# Patient Record
Sex: FEMALE | Race: WHITE | Employment: PART TIME | URBAN - METROPOLITAN AREA
[De-identification: names, ages, dates, MRNs, and addresses within clinical notes are randomized per-mention and may not be internally consistent; named-entity substitution may affect disease eponyms.]

---

## 2017-11-03 ENCOUNTER — OFFICE VISIT (OUTPATIENT)
Dept: FAMILY MEDICINE CLINIC | Age: 52
End: 2017-11-03

## 2017-11-03 VITALS
OXYGEN SATURATION: 100 % | SYSTOLIC BLOOD PRESSURE: 120 MMHG | WEIGHT: 181 LBS | RESPIRATION RATE: 16 BRPM | HEIGHT: 66 IN | DIASTOLIC BLOOD PRESSURE: 73 MMHG | HEART RATE: 93 BPM | BODY MASS INDEX: 29.09 KG/M2 | TEMPERATURE: 97.4 F

## 2017-11-03 DIAGNOSIS — G43.809 OTHER MIGRAINE WITHOUT STATUS MIGRAINOSUS, NOT INTRACTABLE: Primary | ICD-10-CM

## 2017-11-03 DIAGNOSIS — R11.0 NAUSEA: ICD-10-CM

## 2017-11-03 DIAGNOSIS — M50.30 DDD (DEGENERATIVE DISC DISEASE), CERVICAL: ICD-10-CM

## 2017-11-03 DIAGNOSIS — Z12.39 SCREENING FOR BREAST CANCER: ICD-10-CM

## 2017-11-03 DIAGNOSIS — Z23 ENCOUNTER FOR IMMUNIZATION: ICD-10-CM

## 2017-11-03 DIAGNOSIS — I10 ESSENTIAL HYPERTENSION: ICD-10-CM

## 2017-11-03 DIAGNOSIS — R10.13 EPIGASTRIC ABDOMINAL PAIN: ICD-10-CM

## 2017-11-03 DIAGNOSIS — M51.36 DDD (DEGENERATIVE DISC DISEASE), LUMBAR: ICD-10-CM

## 2017-11-03 PROBLEM — G43.909 MIGRAINE: Status: ACTIVE | Noted: 2017-11-03

## 2017-11-03 RX ORDER — OMEPRAZOLE 20 MG/1
CAPSULE, DELAYED RELEASE ORAL
COMMUNITY
Start: 2017-10-02 | End: 2017-11-03 | Stop reason: ALTCHOICE

## 2017-11-03 RX ORDER — LIDOCAINE 50 MG/G
PATCH TOPICAL EVERY 24 HOURS
COMMUNITY

## 2017-11-03 RX ORDER — SUMATRIPTAN 50 MG/1
100 TABLET, FILM COATED ORAL
COMMUNITY
End: 2017-11-03 | Stop reason: SDUPTHER

## 2017-11-03 RX ORDER — TOPIRAMATE 50 MG/1
50 TABLET, FILM COATED ORAL DAILY
COMMUNITY
End: 2018-07-13 | Stop reason: ALTCHOICE

## 2017-11-03 RX ORDER — TRAMADOL HYDROCHLORIDE 50 MG/1
TABLET ORAL
Refills: 0 | COMMUNITY
Start: 2017-10-26 | End: 2019-10-16

## 2017-11-03 RX ORDER — DULOXETIN HYDROCHLORIDE 30 MG/1
CAPSULE, DELAYED RELEASE ORAL
COMMUNITY
Start: 2017-08-22 | End: 2017-11-03 | Stop reason: ALTCHOICE

## 2017-11-03 RX ORDER — SUMATRIPTAN 50 MG/1
100 TABLET, FILM COATED ORAL
Qty: 10 TAB | Refills: 0 | Status: SHIPPED | OUTPATIENT
Start: 2017-11-03 | End: 2017-12-07 | Stop reason: SDUPTHER

## 2017-11-03 RX ORDER — LISINOPRIL 5 MG/1
TABLET ORAL
COMMUNITY
Start: 2017-10-02 | End: 2018-07-13

## 2017-11-03 RX ORDER — DICLOFENAC SODIUM 10 MG/G
GEL TOPICAL
Refills: 2 | COMMUNITY
Start: 2017-09-12 | End: 2019-11-01 | Stop reason: ALTCHOICE

## 2017-11-03 RX ORDER — ONDANSETRON 4 MG/1
TABLET, ORALLY DISINTEGRATING ORAL
COMMUNITY
Start: 2017-10-02 | End: 2017-11-03 | Stop reason: SDUPTHER

## 2017-11-03 RX ORDER — DULOXETIN HYDROCHLORIDE 60 MG/1
CAPSULE, DELAYED RELEASE ORAL
COMMUNITY
Start: 2017-08-22 | End: 2017-11-03 | Stop reason: ALTCHOICE

## 2017-11-03 RX ORDER — PANTOPRAZOLE SODIUM 40 MG/1
40 TABLET, DELAYED RELEASE ORAL 2 TIMES DAILY
Qty: 60 TAB | Refills: 5 | Status: SHIPPED | OUTPATIENT
Start: 2017-11-03 | End: 2018-06-15 | Stop reason: SDUPTHER

## 2017-11-03 RX ORDER — ESTROGENS, CONJUGATED 0.9 MG/1
TABLET, FILM COATED ORAL
COMMUNITY
Start: 2017-09-18 | End: 2018-04-22 | Stop reason: SDUPTHER

## 2017-11-03 RX ORDER — ONDANSETRON 4 MG/1
4 TABLET, ORALLY DISINTEGRATING ORAL
Qty: 30 TAB | Refills: 0 | Status: SHIPPED | OUTPATIENT
Start: 2017-11-03 | End: 2018-06-15 | Stop reason: SDUPTHER

## 2017-11-03 NOTE — PROGRESS NOTES
Chief Complaint   Patient presents with    New Patient    Hypertension    Migraine    Medication Refill    Nausea     Pt states that she had a ABD US at [facility] on 10-17-17. HISTORY OF PRESENT ILLNESS  Shaye Courser is a 46 y.o. female. HPI   Pt here to establish. She has been seen by [facility] providers but wanted to switch to [facility]. Pt has hx of htn. Her bp is well controlled with her current regimen. Pt reports that she has been having issues with back pain. She has DDD that started in her neck 20 yrs ago; now she has it in her lower back as well. She has had PT and is seeing pain management. This is going well. She has had JOHANA that worked nicely for her. Now her mid-back is starting to bother her. She will f/u with pain management for this concern as well. Pt had been having an evaluation for abdominal issues. This has been going on for a few years. NSAIDs give her stomach problems. Since Aug 2017, she has had nausea daily. Sometimes it lasts all day. She was told initially that she had indigestion. She does have gerd but prilosec does not help. Pt was given zofran that does help somewhat. Pt has had an abd ultrasound but does not have the results. She was given a copy of the images but does not have the report. Pt was not able to see gi. She has not had any vomiting. She sometimes feels like vomiting would improve the nausea. Pt does have hx of IBS. She has been somewhat constipated lately which is not normal for her. She does feel that she is staying well hydrated. Pt has hx of migraine. It had improved after a hysterectomy in 2013 and she was able to stop topamax. The migraines returned so she went back on topamax. She now wants to stop topamax again. Pt recalls that she has been on amitriptylene in the past and it was too sedating so she knows she would not want to try that again.       Review of Systems   Constitutional: Negative. HENT: Negative. Respiratory: Negative. Cardiovascular: Negative. Gastrointestinal: Positive for abdominal pain, constipation and nausea. Negative for vomiting. Musculoskeletal: Positive for back pain and neck pain. All other systems reviewed and are negative. Physical Exam  Physical Exam   Nursing note and vitals reviewed. Constitutional: She is oriented to person, place, and time. She appears well-developed and well-nourished. HENT:   Head: Normocephalic and atraumatic. Right Ear: External ear normal.   Left Ear: External ear normal.   Nose: Nose normal.   Eyes: Conjunctivae and EOM are normal.   Neck: Normal range of motion. Neck supple. No JVD present. Carotid bruit is not present. No thyromegaly present. Cardiovascular: Normal rate, regular rhythm, normal heart sounds and intact distal pulses. Exam reveals no gallop and no friction rub. No murmur heard. Pulmonary/Chest: Effort normal and breath sounds normal. She has no wheezes. She has no rhonchi. She has no rales. Abdominal: Soft. Bowel sounds are normal.  moderate mid-epigastric abd pain. No hepatosplenomegaly. Musculoskeletal: Normal range of motion. Neurological: She is alert and oriented to person, place, and time. Coordination normal.   Skin: Skin is warm and dry. Psychiatric: She has a normal mood and affect. Her behavior is normal. Judgment and thought content normal.     ASSESSMENT and PLAN  Diagnoses and all orders for this visit:    1. Other migraine without status migrainosus, not intractable  -     SUMAtriptan (IMITREX) 50 mg tablet; Take 2 Tabs by mouth once as needed for Migraine. 2. Essential hypertension  Stable, cont pres tx plan. 3. DDD (degenerative disc disease), cervical  4. DDD (degenerative disc disease), lumbar  Care as per pain management. 5. Nausea  -     REFERRAL TO GASTROENTEROLOGY  -     pantoprazole (PROTONIX) 40 mg tablet; Take 1 Tab by mouth two (2) times a day.   - ondansetron (ZOFRAN ODT) 4 mg disintegrating tablet; Take 1 Tab by mouth every eight (8) hours as needed for Nausea. 6. Epigastric abdominal pain  -     REFERRAL TO GASTROENTEROLOGY  -     pantoprazole (PROTONIX) 40 mg tablet; Take 1 Tab by mouth two (2) times a day. 7. Screening for breast cancer  -     [facility] MAMMO BI SCREENING INCL CAD; Future    8.  Encounter for immunization  -     Influenza virus vaccine (QUADRIVALENT PRES FREE SYRINGE) IM ([id number])      Follow-up Disposition: 1 month; sooner prn

## 2017-11-03 NOTE — PROGRESS NOTES
Slade Lao 46 y.o. female   Chief Complaint   Patient presents with    New Patient    Hypertension    Migraine    Medication Refill         Slade Lao 1965 female who presents for routine immunizations. Patient denies any symptoms , reactions or allergies that would exclude them from being immunized today. Risks and adverse reactions were discussed and the VIS was given to them. All questions were addressed. Order placed for flu,  per Verbal Order from  with read back. Patient was observed for 15 min post injection. There were no reactions observed.     Lalita Mcnamara LPN

## 2017-11-14 ENCOUNTER — TELEPHONE (OUTPATIENT)
Dept: FAMILY MEDICINE CLINIC | Age: 52
End: 2017-11-14

## 2017-11-14 NOTE — TELEPHONE ENCOUNTER
Pt called and stated that she was referred to [facility] and states she called [facility] an no referral has been placed as of yet. Pt would like to know status. Please advise.

## 2017-11-21 ENCOUNTER — HOSPITAL ENCOUNTER (OUTPATIENT)
Dept: MAMMOGRAPHY | Age: 52
Discharge: HOME | End: 2017-11-21
Attending: FAMILY MEDICINE
Payer: OTHER GOVERNMENT

## 2017-11-21 DIAGNOSIS — Z12.39 SCREENING FOR BREAST CANCER: ICD-10-CM

## 2017-11-21 PROCEDURE — 77067 SCR MAMMO BI INCL CAD: CPT

## 2017-12-04 ENCOUNTER — OFFICE VISIT (OUTPATIENT)
Dept: FAMILY MEDICINE CLINIC | Age: 52
End: 2017-12-04

## 2017-12-04 VITALS
BODY MASS INDEX: 28.77 KG/M2 | DIASTOLIC BLOOD PRESSURE: 78 MMHG | TEMPERATURE: 98 F | RESPIRATION RATE: 18 BRPM | HEIGHT: 66 IN | SYSTOLIC BLOOD PRESSURE: 135 MMHG | WEIGHT: 179 LBS | HEART RATE: 96 BPM | OXYGEN SATURATION: 100 %

## 2017-12-04 DIAGNOSIS — Z79.890 POSTMENOPAUSAL HRT (HORMONE REPLACEMENT THERAPY): ICD-10-CM

## 2017-12-04 DIAGNOSIS — I10 ESSENTIAL HYPERTENSION: ICD-10-CM

## 2017-12-04 DIAGNOSIS — G43.809 OTHER MIGRAINE WITHOUT STATUS MIGRAINOSUS, NOT INTRACTABLE: Primary | ICD-10-CM

## 2017-12-04 RX ORDER — ATENOLOL 25 MG/1
25 TABLET ORAL DAILY
Qty: 30 TAB | Refills: 5 | Status: SHIPPED | OUTPATIENT
Start: 2017-12-04 | End: 2018-06-03 | Stop reason: SDUPTHER

## 2017-12-04 NOTE — PROGRESS NOTES
HISTORY OF PRESENT ILLNESS  Master Osborn is a 46 y.o. female. Chief Complaint   Patient presents with    Follow-up     1 month f/u    Migraine    Hypertension       HPI  Patient is here for a 1 month follow up of htn, and migraines. Pt reports that her stomach did improve with the protonix bid. Pt has also been seen by Dr Chyna Lao (gi). She will have an egd in Jan 2018. Pt has been tapering off of topamax. She has not had an increase in the frequency of her ha's. She has had a few but they do respond after taking imitrex 100mg. Pt is interested in trying a bp med to help prevent migraines. Patient does need medication refills today. Patient requests electronic refills. New concerns today: pt has been on HRT. She would like a referral to gyn to cont this. Review of Systems   Constitutional: Negative. HENT: Negative. Respiratory: Negative. Cardiovascular: Negative. Gastrointestinal: Positive for abdominal pain. All other systems reviewed and are negative. Physical Exam  Physical Exam   Nursing note and vitals reviewed. Constitutional: She is oriented to person, place, and time. She appears well-developed and well-nourished. HENT:   Head: Normocephalic and atraumatic. Right Ear: External ear normal.   Left Ear: External ear normal.   Nose: Nose normal.   Eyes: Conjunctivae and EOM are normal.   Neck: Normal range of motion. Neck supple. No JVD present. Carotid bruit is not present. No thyromegaly present. Cardiovascular: Normal rate, regular rhythm, normal heart sounds and intact distal pulses. Exam reveals no gallop and no friction rub. No murmur heard. Pulmonary/Chest: Effort normal and breath sounds normal. She has no wheezes. She has no rhonchi. She has no rales. Abdominal: Soft. Bowel sounds are normal.   Musculoskeletal: Normal range of motion. Neurological: She is alert and oriented to person, place, and time.  Coordination normal.   Skin: Skin is warm and dry. Psychiatric: She has a normal mood and affect. Her behavior is normal. Judgment and thought content normal.     ASSESSMENT and PLAN  Diagnoses and all orders for this visit:    1. Other migraine without status migrainosus, not intractable  -    Trial:  atenolol (TENORMIN) 25 mg tablet; Take 1 Tab by mouth daily. Start after topamax taper is complete. 2. Essential hypertension  Stable, cont pres tx plan.      3. Postmenopausal HRT (hormone replacement therapy)  -     REFERRAL TO OBSTETRICS AND GYNECOLOGY      Follow-up Disposition: 1 month; sooner prn

## 2017-12-04 NOTE — PROGRESS NOTES
Eri Estrada 46 y.o. female   Chief Complaint   Patient presents with    Follow-up     1 month f/u    Migraine    Hypertension         1. Have you been to the ER, urgent care clinic since your last visit? Hospitalized since your last visit? No    2. Have you seen or consulted any other health care providers outside of the [address] [place] [address] since your last visit? Include any pap smears or colon screening.  No

## 2017-12-07 DIAGNOSIS — G43.809 OTHER MIGRAINE WITHOUT STATUS MIGRAINOSUS, NOT INTRACTABLE: ICD-10-CM

## 2017-12-08 RX ORDER — SUMATRIPTAN 50 MG/1
100 TABLET, FILM COATED ORAL
Qty: 10 TAB | Refills: 0 | Status: SHIPPED | OUTPATIENT
Start: 2017-12-08 | End: 2018-02-05 | Stop reason: SDUPTHER

## 2018-01-19 ENCOUNTER — OFFICE VISIT (OUTPATIENT)
Dept: FAMILY MEDICINE CLINIC | Age: 53
End: 2018-01-19

## 2018-01-19 VITALS
RESPIRATION RATE: 20 BRPM | BODY MASS INDEX: 29.25 KG/M2 | DIASTOLIC BLOOD PRESSURE: 62 MMHG | WEIGHT: 182 LBS | OXYGEN SATURATION: 98 % | TEMPERATURE: 98.2 F | HEART RATE: 84 BPM | SYSTOLIC BLOOD PRESSURE: 118 MMHG | HEIGHT: 66 IN

## 2018-01-19 DIAGNOSIS — G43.809 OTHER MIGRAINE WITHOUT STATUS MIGRAINOSUS, NOT INTRACTABLE: ICD-10-CM

## 2018-01-19 DIAGNOSIS — I10 ESSENTIAL HYPERTENSION: Primary | ICD-10-CM

## 2018-01-19 DIAGNOSIS — N28.1 RENAL CYST: ICD-10-CM

## 2018-01-19 RX ORDER — ONDANSETRON 4 MG/1
TABLET, FILM COATED ORAL
Refills: 0 | COMMUNITY
Start: 2018-01-12 | End: 2018-06-15 | Stop reason: SDUPTHER

## 2018-01-19 NOTE — PROGRESS NOTES
Chief Complaint   Patient presents with    Follow-up     1 month f/u    Hypertension    Migraine     Discuss alternative medication, imitrex not effective.  Results     discuss MRI completed on 12-12-17     1. Have you been to the ER, urgent care clinic since your last visit? Hospitalized since your last visit? No    2. Have you seen or consulted any other health care providers outside of the [address] [place] [address] since your last visit? Include any pap smears or colon screening.  No

## 2018-01-19 NOTE — PROGRESS NOTES
HISTORY OF PRESENT ILLNESS  Adolphus Duverney is a 46 y.o. female. Chief Complaint   Patient presents with    Follow-up     1 month f/u    Hypertension    Migraine     Discuss alternative medication, imitrex not effective.  Results     discuss MRI completed on 12-12-17       HPI  Patient is here for a 1 month follow up of Htn, migraines, and MRI results completed on 12-12-17. Patient would like to discuss an alternative migraine medication. She does not feel that the imitrex is as effective. She has stopped the topamax since her last visit and the migraines are happening more often. When the ha's occur, the imitrex decreases the pain but then ha is back the following day at the prior pain level. She is concerned that perhaps the imitrex is causing rebound ha. The ha's have occurred roughly for about 5 days in a row but she will go 3 wks or so prior to having ha again. She has significant nausea and wonders if the migraines can be related to her gi issues. Pt has not yet seen gyn to discuss HRT. Pt has seen gi recently. pt had an injection recently for her back. Patient does need medication refills today. Patient requests electronic refills. New concerns today:  Pt had an mri of her spine that showed an incidental cystic structure in her kidney or adrenal gland. Pt was told to f/u for that. Review of Systems   Constitutional: Negative. HENT: Negative. Respiratory: Negative. Cardiovascular: Negative. Neurological: Positive for headaches. All other systems reviewed and are negative. Physical Exam  Physical Exam   Nursing note and vitals reviewed. Constitutional: She is oriented to person, place, and time. She appears well-developed and well-nourished. HENT:   Head: Normocephalic and atraumatic. Right Ear: External ear normal.   Left Ear: External ear normal.   Nose: Nose normal.   Eyes: Conjunctivae and EOM are normal.   Neck: Normal range of motion. Neck supple. No JVD present. Carotid bruit is not present. No thyromegaly present. Cardiovascular: Normal rate, regular rhythm, normal heart sounds and intact distal pulses. Exam reveals no gallop and no friction rub. No murmur heard. Pulmonary/Chest: Effort normal and breath sounds normal. She has no wheezes. She has no rhonchi. She has no rales. Abdominal: Soft. Bowel sounds are normal.   Musculoskeletal: Normal range of motion. Neurological: She is alert and oriented to person, place, and time. Coordination normal.   Skin: Skin is warm and dry. Psychiatric: She has a normal mood and affect. Her behavior is normal. Judgment and thought content normal.     ASSESSMENT and PLAN  Diagnoses and all orders for this visit:    1. Essential hypertension  Stable, cont pres tx plan.      2. Other migraine without status migrainosus, not intractable  -     REFERRAL TO NEUROLOGY    3. Renal cyst  -     [facility] Ref MMD      Follow-up Disposition: 3 months; sooner prn

## 2018-02-05 DIAGNOSIS — G43.809 OTHER MIGRAINE WITHOUT STATUS MIGRAINOSUS, NOT INTRACTABLE: ICD-10-CM

## 2018-02-06 RX ORDER — SUMATRIPTAN 50 MG/1
TABLET, FILM COATED ORAL
Qty: 10 TAB | Refills: 0 | Status: SHIPPED | OUTPATIENT
Start: 2018-02-06 | End: 2018-05-28 | Stop reason: SDUPTHER

## 2018-04-23 RX ORDER — ESTROGENS, CONJUGATED 0.9 MG/1
TABLET, FILM COATED ORAL
Qty: 90 TAB | Refills: 3 | Status: SHIPPED | OUTPATIENT
Start: 2018-04-23 | End: 2018-06-15 | Stop reason: SDUPTHER

## 2018-06-15 ENCOUNTER — OFFICE VISIT (OUTPATIENT)
Dept: FAMILY MEDICINE CLINIC | Age: 53
End: 2018-06-15

## 2018-06-15 VITALS
OXYGEN SATURATION: 98 % | RESPIRATION RATE: 16 BRPM | WEIGHT: 183 LBS | DIASTOLIC BLOOD PRESSURE: 73 MMHG | HEART RATE: 84 BPM | BODY MASS INDEX: 29.41 KG/M2 | TEMPERATURE: 98.3 F | HEIGHT: 66 IN | SYSTOLIC BLOOD PRESSURE: 128 MMHG

## 2018-06-15 DIAGNOSIS — I10 ESSENTIAL HYPERTENSION: ICD-10-CM

## 2018-06-15 DIAGNOSIS — R11.0 NAUSEA: ICD-10-CM

## 2018-06-15 DIAGNOSIS — G43.809 OTHER MIGRAINE WITHOUT STATUS MIGRAINOSUS, NOT INTRACTABLE: Primary | ICD-10-CM

## 2018-06-15 DIAGNOSIS — R10.13 EPIGASTRIC ABDOMINAL PAIN: ICD-10-CM

## 2018-06-15 DIAGNOSIS — Z79.890 POSTMENOPAUSAL HRT (HORMONE REPLACEMENT THERAPY): ICD-10-CM

## 2018-06-15 RX ORDER — ONDANSETRON 4 MG/1
4 TABLET, ORALLY DISINTEGRATING ORAL
Qty: 30 TAB | Refills: 0 | Status: SHIPPED | OUTPATIENT
Start: 2018-06-15 | End: 2019-08-05 | Stop reason: SDUPTHER

## 2018-06-15 RX ORDER — ATENOLOL 25 MG/1
TABLET ORAL
Qty: 30 TAB | Refills: 0 | Status: SHIPPED | OUTPATIENT
Start: 2018-06-15 | End: 2018-07-13 | Stop reason: SDUPTHER

## 2018-06-15 RX ORDER — SUMATRIPTAN 50 MG/1
TABLET, FILM COATED ORAL
Qty: 10 TAB | Refills: 0 | Status: SHIPPED | OUTPATIENT
Start: 2018-06-15 | End: 2019-08-05 | Stop reason: SDUPTHER

## 2018-06-15 RX ORDER — PANTOPRAZOLE SODIUM 40 MG/1
40 TABLET, DELAYED RELEASE ORAL 2 TIMES DAILY
Qty: 60 TAB | Refills: 5 | Status: SHIPPED | OUTPATIENT
Start: 2018-06-15 | End: 2018-07-20 | Stop reason: SDUPTHER

## 2018-06-15 NOTE — MR AVS SNAPSHOT
[address] [place] [address] 
 
 
 Sofia Hwang [id number] 
[phone] Patient: Riley Duff MRN: [id number] :1965 Visit Information Date & Time Provider Department Dept. Phone Encounter #  
 6/15/2018  9:00 AM Truong Caballero NP [address] [id number] Your Appointments 2018 10:45 AM  
Follow Up with Benny Westfall MD  
[facility] (--) Appt Note: med refill [place]wang [id number]  
[phone]  
  
   
 Sofia Hwang [id number] Upcoming Health Maintenance Date Due Hepatitis C Screening 1965 DTaP/Tdap/Td series (1 - Tdap) 3/18/1986 PAP AKA CERVICAL CYTOLOGY 3/18/1986 FOBT Q 1 YEAR AGE 50-75 3/18/2015 Influenza Age 5 to Adult 2018 BREAST CANCER SCRN MAMMOGRAM 2019 Allergies as of 6/15/2018  Review Complete On: 6/15/2018 By: Truong Caballero NP Severity Noted Reaction Type Reactions Prednisone  2017    Other (comments) Current Immunizations  Never Reviewed Name Date Influenza Vaccine (Quad) PF 11/3/2017 Not reviewed this visit You Were Diagnosed With   
  
 Codes Comments Other migraine without status migrainosus, not intractable    -  Primary ICD-10-CM: G17.757 ICD-9-CM: 346.80 Nausea     ICD-10-CM: R11.0 ICD-9-CM: 787.02 Epigastric abdominal pain     ICD-10-CM: R10.13 ICD-9-CM: 789.06 Essential hypertension     ICD-10-CM: I10 
ICD-9-CM: 401.9 Postmenopausal HRT (hormone replacement therapy)     ICD-10-CM: A17.156 ICD-9-CM: V07.4 Vitals BP Pulse Temp Resp Height(growth percentile) Weight(growth percentile) 128/73 (BP 1 Location: Right arm, BP Patient Position: Sitting) 84 98.3 °F (36.8 °C) (Oral) 16 5' 5.5\" (1.664 m) 183 lb (83 kg) SpO2 BMI OB Status Smoking Status 98% 29.99 kg/m2 Hysterectomy Former Smoker BMI and BSA Data Body Mass Index Body Surface Area  
 29.99 kg/m 2 1.96 m 2 Preferred Pharmacy Pharmacy Name Phone [facility] [address] [place] [address], [address] [place] [address] Rd [phone] Your Updated Medication List  
  
   
This list is accurate as of 6/15/18  9:54 AM.  Always use your most recent med list.  
  
  
  
  
 atenolol 25 mg tablet Commonly known as:  TENORMIN  
take 1 tablet by mouth once daily  
  
 conjugated estrogens 0.9 mg tablet Commonly known as:  PREMARIN  
take 1 tablet by mouth once daily  
  
 diclofenac 1 % Gel Commonly known as:  VOLTAREN  
apply 2.5 grams to the affected areas 3-4 times daily  
  
 lidocaine 5 % Commonly known as:  LIDODERM  
by TransDERmal route every twenty-four (24) hours. Apply patch to the affected area for 12 hours a day and remove for 12 hours a day. lisinopril 5 mg tablet Commonly known as:  PRINIVIL, ZESTRIL  
  
 ondansetron 4 mg disintegrating tablet Commonly known as:  ZOFRAN ODT Take 1 Tab by mouth every eight (8) hours as needed for Nausea. pantoprazole 40 mg tablet Commonly known as:  PROTONIX Take 1 Tab by mouth two (2) times a day. SUMAtriptan 50 mg tablet Commonly known as:  IMITREX  
TAKE 2 TABLETS BY MOUTH ONCE DAILY AS NEEDED FOR MIGRAINE. DO NOT TAKE WITH TRAMADOL  
  
 TOPAMAX 50 mg tablet Generic drug:  topiramate Take 50 mg by mouth daily. traMADol 50 mg tablet Commonly known as:  ULTRAM  
take 1 tablet by mouth twice a day Prescriptions Sent to Pharmacy Refills  
 atenolol (TENORMIN) 25 mg tablet 0 Sig: take 1 tablet by mouth once daily Class: Normal  
 Pharmacy: [address] [place] [address], [address] [place] [address] Rd Ph #: [phone] SUMAtriptan (IMITREX) 50 mg tablet 0 Sig: TAKE 2 TABLETS BY MOUTH ONCE DAILY AS NEEDED FOR MIGRAINE. DO NOT TAKE WITH TRAMADOL  Class: Normal  
 [place] [facility] [address] Ph #: [phone]  
 conjugated estrogens (PREMARIN) 0.9 mg tablet 3 Sig: take 1 tablet by mouth once daily Class: Normal  
 Pharmacy: [place] [address] Ph #: [phone]  
 pantoprazole (PROTONIX) 40 mg tablet 5 Sig: Take 1 Tab by mouth two (2) times a day. Class: Normal  
 Pharmacy: [address] [place] [address], [address] [place] [address] Ph #: [phone] Route: Oral  
 ondansetron (ZOFRAN ODT) 4 mg disintegrating tablet 0 Sig: Take 1 Tab by mouth every eight (8) hours as needed for Nausea. Class: Normal  
 Pharmacy: [address] [place] [address], [address] [place] [address] Ph #: [phone] Route: Oral  
  
We Performed the Following REFERRAL TO GYNECOLOGY [REF30 Custom] Comments:  
 Please evaluate and treat patient for HRT . To-Do List   
 06/15/2018 Lab:  CBC WITH AUTOMATED DIFF Around 09/13/2018 Lab:  LIPID PANEL Around 09/13/2018 Lab:  METABOLIC PANEL, COMPREHENSIVE Referral Information Referral ID Referred By Referred To  
  
 [id number] Jose Coker MD   
   [address] [address] Phone: [phone] Fax: [phone] Visits Status Start Date End Date 1 New Request 6/15/18 6/15/19 If your referral has a status of pending review or denied, additional information will be sent to support the outcome of this decision. Patient Instructions Please contact our office if you have any questions about your visit today. Introducing [facility]! Roberto Guardado introduces [employer] patient portal. Now you can access parts of your medical record, email your doctor's office, and request medication refills online. 1. In your internet browser, go to [email or web address] 2. Click on the First Time User? Click Here link in the Sign In box. You will see the New Member Sign Up page. 3. Enter your [employer] Access Code exactly as it appears below. You will not need to use this code after youve completed the sign-up process. If you do not sign up before the expiration date, you must request a new code. · [employer] Access Code: [email or web address] Expires: 9/13/2018  9:54 AM 
 
4. Enter the last four digits of your Social Security Number (xxxx) and Date of Birth (mm/dd/yyyy) as indicated and click Submit. You will be taken to the next sign-up page. 5. Create a [employer] ID. This will be your [employer] login ID and cannot be changed, so think of one that is secure and easy to remember. 6. Create a [employer] password. You can change your password at any time. 7. Enter your Password Reset Question and Answer. This can be used at a later time if you forget your password. 8. Enter your e-mail address. You will receive e-mail notification when new information is available in [address] 9. Click Sign Up. You can now view and download portions of your medical record. 10. Click the Download Summary menu link to download a portable copy of your medical information. If you have questions, please visit the Frequently Asked Questions section of the [employer] website. Remember, [employer] is NOT to be used for urgent needs. For medical emergencies, dial 911. Now available from your iPhone and Android! Please provide this summary of care documentation to your next provider. Your primary care clinician is listed as Gage Cerda. If you have any questions after today's visit, please call [phone].

## 2018-06-15 NOTE — PROGRESS NOTES
DELVIN  Christian Hess is a 48 y.o. female  Chief Complaint   Patient presents with    Follow-up     Patient last seen in office by PCP 1/ 19/2018    Medication Refill    Referral Request     OB/GYN     Dizziness associated with nausea and reports it is intermittent. Reports she has chronic history of abdominal pain and she was sent to GI. Reports she is using Zofran as needed. Reports Protonix helps Reports she needs a gyn referral for her hormone replacement therapy. Hypertension - Denies chest pain or shortness of breath. Reports she is out of her blood pressure medications and she needs a refill until she sees her PCP. Requesting refill for her Imitrex as she has migraines. Denies headache currently. Past Medical History  Past Medical History:   Diagnosis Date    Back problem     Constipation     Hypertension     Joint pain        Surgical History  Past Surgical History:   Procedure Laterality Date    HX HYSTERECTOMY          Medications  Current Outpatient Prescriptions   Medication Sig Dispense Refill    atenolol (TENORMIN) 25 mg tablet take 1 tablet by mouth once daily 30 Tab 0    SUMAtriptan (IMITREX) 50 mg tablet TAKE 2 TABLETS BY MOUTH ONCE DAILY AS NEEDED FOR MIGRAINE. DO NOT TAKE WITH TRAMADOL 10 Tab 0    conjugated estrogens (PREMARIN) 0.9 mg tablet take 1 tablet by mouth once daily 90 Tab 3    pantoprazole (PROTONIX) 40 mg tablet Take 1 Tab by mouth two (2) times a day. 60 Tab 5    ondansetron (ZOFRAN ODT) 4 mg disintegrating tablet Take 1 Tab by mouth every eight (8) hours as needed for Nausea. 30 Tab 0    traMADol (ULTRAM) 50 mg tablet take 1 tablet by mouth twice a day  0    diclofenac (VOLTAREN) 1 % gel apply 2.5 grams to the affected areas 3-4 times daily  2    lidocaine (LIDODERM) 5 % by TransDERmal route every twenty-four (24) hours. Apply patch to the affected area for 12 hours a day and remove for 12 hours a day.       lisinopril (PRINIVIL, ZESTRIL) 5 mg tablet       topiramate (TOPAMAX) 50 mg tablet Take 50 mg by mouth daily. Allergies  Allergies   Allergen Reactions    Prednisone Other (comments)       Family History  Family History   Problem Relation Age of Onset    Hypertension Mother     Cancer Father      Prostate    Diabetes Father     Hypertension Father     Breast Cancer Maternal Aunt     Breast Cancer Paternal Aunt        Social History  Social History     Social History    Marital status:      Spouse name: N/A    Number of children: N/A    Years of education: N/A     Occupational History    Not on file. Social History Main Topics    Smoking status: Former Smoker    Smokeless tobacco: Never Used    Alcohol use 0.6 oz/week     1 Cans of beer per week    Drug use: No    Sexual activity: Yes     Other Topics Concern    Not on file     Social History Narrative       Problem List  Patient Active Problem List   Diagnosis Code    DDD (degenerative disc disease), lumbar M51.36    DDD (degenerative disc disease), cervical M50.30    Essential hypertension I10    Migraine G43.909       Review of Systems  Review of Systems   Respiratory: Negative for shortness of breath. Cardiovascular: Negative for chest pain and palpitations. Gastrointestinal: Positive for abdominal pain and nausea. Musculoskeletal: Positive for back pain (chronic) and neck pain (chronic). Neurological: Positive for dizziness and headaches. Vital Signs  Vitals:    06/15/18 0909   BP: 128/73   Pulse: 84   Resp: 16   Temp: 98.3 °F (36.8 °C)   TempSrc: Oral   SpO2: 98%   Weight: 183 lb (83 kg)   Height: 5' 5.5\" (1.664 m)   PainSc:   3   PainLoc: Back       Physical Exam  Physical Exam   Constitutional: She is oriented to person, place, and time. HENT:   Mouth/Throat: Oropharynx is clear and moist.   Eyes: Pupils are equal, round, and reactive to light. Cardiovascular: Normal rate, regular rhythm, normal heart sounds and intact distal pulses. Pulmonary/Chest: Effort normal and breath sounds normal. No respiratory distress. Abdominal: Soft. Bowel sounds are normal. She exhibits no distension. There is no tenderness. Neurological: She is alert and oriented to person, place, and time. Psychiatric: She has a normal mood and affect. Her behavior is normal.   Vitals reviewed. Diagnostics  Orders Placed This Encounter    CBC WITH AUTOMATED DIFF     Standing Status:   Future     Standing Expiration Date:   4/32/1721    METABOLIC PANEL, COMPREHENSIVE     Standing Status:   Future     Standing Expiration Date:   12/13/2018    LIPID PANEL     Standing Status:   Future     Standing Expiration Date:   12/13/2018    T4, FREE     Standing Status:   Future     Standing Expiration Date:   6/16/2019    TSH 3RD GENERATION     Standing Status:   Future     Standing Expiration Date:   12/13/2018    REFERRAL TO GYNECOLOGY     Referral Priority:   Routine     Referral Type:   Consultation     Referral Reason:   Specialty Services Required     Referral Location:   [facility] [place]     Referred to Provider:   Katarina Chase MD     Requested Specialty:   Gynecology    atenolol (TENORMIN) 25 mg tablet     Sig: take 1 tablet by mouth once daily     Dispense:  30 Tab     Refill:  0     Appointment required before further refills will be authorized.  SUMAtriptan (IMITREX) 50 mg tablet     Sig: TAKE 2 TABLETS BY MOUTH ONCE DAILY AS NEEDED FOR MIGRAINE. DO NOT TAKE WITH TRAMADOL     Dispense:  10 Tab     Refill:  0    conjugated estrogens (PREMARIN) 0.9 mg tablet     Sig: take 1 tablet by mouth once daily     Dispense:  90 Tab     Refill:  3    pantoprazole (PROTONIX) 40 mg tablet     Sig: Take 1 Tab by mouth two (2) times a day. Dispense:  60 Tab     Refill:  5    ondansetron (ZOFRAN ODT) 4 mg disintegrating tablet     Sig: Take 1 Tab by mouth every eight (8) hours as needed for Nausea.      Dispense:  30 Tab     Refill:  0       Results  No results found for this or any previous visit. Assessment and Plan  Diagnoses and all orders for this visit:    1. Other migraine without status migrainosus, not intractable  -     atenolol (TENORMIN) 25 mg tablet; take 1 tablet by mouth once daily  -     SUMAtriptan (IMITREX) 50 mg tablet; TAKE 2 TABLETS BY MOUTH ONCE DAILY AS NEEDED FOR MIGRAINE. DO NOT TAKE WITH TRAMADOL    2. Nausea  -     pantoprazole (PROTONIX) 40 mg tablet; Take 1 Tab by mouth two (2) times a day. -     ondansetron (ZOFRAN ODT) 4 mg disintegrating tablet; Take 1 Tab by mouth every eight (8) hours as needed for Nausea. 3. Epigastric abdominal pain  -     pantoprazole (PROTONIX) 40 mg tablet; Take 1 Tab by mouth two (2) times a day. 4. Essential hypertension  -     CBC WITH AUTOMATED DIFF; Future  -     METABOLIC PANEL, COMPREHENSIVE; Future  -     LIPID PANEL; Future    5. Postmenopausal HRT (hormone replacement therapy)  -     conjugated estrogens (PREMARIN) 0.9 mg tablet; take 1 tablet by mouth once daily  -     REFERRAL TO GYNECOLOGY  -     T4, FREE; Future  -     TSH 3RD GENERATION; Future      Instructed to go for fasting labs prior to her next visit. Medication, side effects, possible allergic reactions and warnings reviewed with patient. Patient verbalized understanding. Discussed warning in detail regarding ultram and imitrex. Patient accepts responsibility and risk. She is aware that she should not take these in combination. After care summary printed and reviewed with patient. Plan reviewed with patient. Questions answered. Patient verbalized understanding of plan and is in agreement with plan. Patient to follow up in three months with her PCP or earlier if symptoms worsen.     SATISH Merritt

## 2018-06-15 NOTE — PROGRESS NOTES
Chief Complaint   Patient presents with    Follow-up     Patient last seen in office by PCP 1/ 19/2018    Medication Refill     1. Have you been to the ER, urgent care clinic since your last visit? Hospitalized since your last visit? No    2. Have you seen or consulted any other health care providers outside of the [address] [place] [address] since your last visit? Include any pap smears or colon screening.  No     Health Maintenance Due   Topic Date Due    Hepatitis C Screening  1965    DTaP/Tdap/Td series (1 - Tdap) 03/18/1986    PAP AKA CERVICAL CYTOLOGY  03/18/1986    FOBT Q 1 YEAR AGE 50-75  03/18/2015

## 2018-07-10 ENCOUNTER — HOSPITAL ENCOUNTER (OUTPATIENT)
Dept: LAB | Age: 53
Discharge: HOME | End: 2018-07-10
Payer: OTHER GOVERNMENT

## 2018-07-10 DIAGNOSIS — Z79.890 POSTMENOPAUSAL HRT (HORMONE REPLACEMENT THERAPY): ICD-10-CM

## 2018-07-10 DIAGNOSIS — I10 ESSENTIAL HYPERTENSION: ICD-10-CM

## 2018-07-10 LAB
BASOPHILS # BLD: 0 K/UL (ref 0–0.1)
BASOPHILS NFR BLD: 1 % (ref 0–2)
DIFFERENTIAL METHOD BLD: NORMAL
EOSINOPHIL # BLD: 0.1 K/UL (ref 0–0.4)
EOSINOPHIL NFR BLD: 2 % (ref 0–5)
ERYTHROCYTE [DISTWIDTH] IN BLOOD BY AUTOMATED COUNT: 13.4 % (ref 11.6–14.5)
HCT VFR BLD AUTO: 42 % (ref 35–45)
HGB BLD-MCNC: 13 G/DL (ref 12–16)
LYMPHOCYTES # BLD: 1.4 K/UL (ref 0.9–3.6)
LYMPHOCYTES NFR BLD: 23 % (ref 21–52)
MCH RBC QN AUTO: 28.8 PG (ref 24–34)
MCHC RBC AUTO-ENTMCNC: 31 G/DL (ref 31–37)
MCV RBC AUTO: 92.9 FL (ref 74–97)
MONOCYTES # BLD: 0.3 K/UL (ref 0.05–1.2)
MONOCYTES NFR BLD: 6 % (ref 3–10)
NEUTS SEG # BLD: 4.3 K/UL (ref 1.8–8)
NEUTS SEG NFR BLD: 68 % (ref 40–73)
PLATELET # BLD AUTO: 210 K/UL (ref 135–420)
PMV BLD AUTO: 11.3 FL (ref 9.2–11.8)
RBC # BLD AUTO: 4.52 M/UL (ref 4.2–5.3)
WBC # BLD AUTO: 6.2 K/UL (ref 4.6–13.2)

## 2018-07-10 PROCEDURE — 36415 COLL VENOUS BLD VENIPUNCTURE: CPT | Performed by: NURSE PRACTITIONER

## 2018-07-10 PROCEDURE — 84439 ASSAY OF FREE THYROXINE: CPT | Performed by: NURSE PRACTITIONER

## 2018-07-10 PROCEDURE — 80061 LIPID PANEL: CPT | Performed by: NURSE PRACTITIONER

## 2018-07-10 PROCEDURE — 80053 COMPREHEN METABOLIC PANEL: CPT | Performed by: NURSE PRACTITIONER

## 2018-07-10 PROCEDURE — 84443 ASSAY THYROID STIM HORMONE: CPT | Performed by: NURSE PRACTITIONER

## 2018-07-10 PROCEDURE — 85025 COMPLETE CBC W/AUTO DIFF WBC: CPT | Performed by: NURSE PRACTITIONER

## 2018-07-11 LAB
ALBUMIN SERPL-MCNC: 3.5 G/DL (ref 3.4–5)
ALBUMIN/GLOB SERPL: 1.2 {RATIO} (ref 0.8–1.7)
ALP SERPL-CCNC: 66 U/L (ref 45–117)
ALT SERPL-CCNC: 19 U/L (ref 13–56)
ANION GAP SERPL CALC-SCNC: 7 MMOL/L (ref 3–18)
AST SERPL-CCNC: 16 U/L (ref 15–37)
BILIRUB SERPL-MCNC: 0.5 MG/DL (ref 0.2–1)
BUN SERPL-MCNC: 14 MG/DL (ref 7–18)
BUN/CREAT SERPL: 17 (ref 12–20)
CALCIUM SERPL-MCNC: 8.5 MG/DL (ref 8.5–10.1)
CHLORIDE SERPL-SCNC: 107 MMOL/L (ref 100–108)
CHOLEST SERPL-MCNC: 152 MG/DL
CO2 SERPL-SCNC: 29 MMOL/L (ref 21–32)
CREAT SERPL-MCNC: 0.81 MG/DL (ref 0.6–1.3)
GLOBULIN SER CALC-MCNC: 2.9 G/DL (ref 2–4)
GLUCOSE SERPL-MCNC: 98 MG/DL (ref 74–99)
HDLC SERPL-MCNC: 65 MG/DL (ref 40–60)
HDLC SERPL: 2.3 {RATIO} (ref 0–5)
LDLC SERPL CALC-MCNC: 58.8 MG/DL (ref 0–100)
LIPID PROFILE,FLP: ABNORMAL
POTASSIUM SERPL-SCNC: 4.6 MMOL/L (ref 3.5–5.5)
PROT SERPL-MCNC: 6.4 G/DL (ref 6.4–8.2)
SODIUM SERPL-SCNC: 143 MMOL/L (ref 136–145)
T4 FREE SERPL-MCNC: 1 NG/DL (ref 0.7–1.5)
TRIGL SERPL-MCNC: 141 MG/DL (ref ?–150)
TSH SERPL DL<=0.05 MIU/L-ACNC: 1.3 UIU/ML (ref 0.36–3.74)
VLDLC SERPL CALC-MCNC: 28.2 MG/DL

## 2018-07-13 ENCOUNTER — OFFICE VISIT (OUTPATIENT)
Dept: FAMILY MEDICINE CLINIC | Age: 53
End: 2018-07-13

## 2018-07-13 VITALS
WEIGHT: 182 LBS | TEMPERATURE: 97.8 F | HEIGHT: 66 IN | SYSTOLIC BLOOD PRESSURE: 126 MMHG | BODY MASS INDEX: 29.25 KG/M2 | DIASTOLIC BLOOD PRESSURE: 75 MMHG | RESPIRATION RATE: 12 BRPM | HEART RATE: 79 BPM

## 2018-07-13 DIAGNOSIS — G43.809 OTHER MIGRAINE WITHOUT STATUS MIGRAINOSUS, NOT INTRACTABLE: ICD-10-CM

## 2018-07-13 DIAGNOSIS — I10 ESSENTIAL HYPERTENSION: Primary | ICD-10-CM

## 2018-07-13 RX ORDER — ATENOLOL 25 MG/1
TABLET ORAL
Qty: 90 TAB | Refills: 3 | Status: SHIPPED | OUTPATIENT
Start: 2018-07-13 | End: 2018-07-20 | Stop reason: SDUPTHER

## 2018-07-13 NOTE — PROGRESS NOTES
HISTORY OF PRESENT ILLNESS  Patrick Alvarez is a 48 y.o. female. Chief Complaint   Patient presents with    Follow-up    Hypertension    Migraine     Patient states that she has not scheduled appointment with Neurology. Patient states that the current dosage of imitrex is effective and she doesn't feel as if she needs to see the neurologist.    Regi Mckenna     completed on 7-10-18    Medication Refill       HPI  Patient is here for a  follow up of Htn, and Migraine. Patient had labs on 7-10-18. Labs reviewed in detail with patient     Patient does need medication refills today. Patient requests electronic refills. New concerns today: Patient states that she has not scheduled appointment with Neurology. Patient states that she feels being on the atenolol has been very helpful. the current dosage of imitrex is effective and she doesn't feel as if she needs to see the neurologist.   She has not needed to take any imitrex at all for over one month; she typically had 1-2 ha per month when she was on the topamax. ROS  Review of Systems   Constitutional: Negative. HENT: Negative. Respiratory: Negative. Cardiovascular: Negative. All other systems reviewed and are negative. Physical Exam  Physical Exam   Nursing note and vitals reviewed. Constitutional: She is oriented to person, place, and time. She appears well-developed and well-nourished. HENT:   Head: Normocephalic and atraumatic. Right Ear: External ear normal.   Left Ear: External ear normal.   Nose: Nose normal.   Eyes: Conjunctivae and EOM are normal.   Neck: Normal range of motion. Neck supple. No JVD present. Carotid bruit is not present. No thyromegaly present. Cardiovascular: Normal rate, regular rhythm, normal heart sounds and intact distal pulses. Exam reveals no gallop and no friction rub. No murmur heard. Pulmonary/Chest: Effort normal and breath sounds normal. She has no wheezes. She has no rhonchi.  She has no rales.   Abdominal: Soft. Bowel sounds are normal.   Musculoskeletal: Normal range of motion. Neurological: She is alert and oriented to person, place, and time. Coordination normal.   Skin: Skin is warm and dry. Psychiatric: She has a normal mood and affect. Her behavior is normal. Judgment and thought content normal.     ASSESSMENT and PLAN  Diagnoses and all orders for this visit:    1. Essential hypertension  Stable, cont pres tx plan. 2. Other migraine without status migrainosus, not intractable  Much improved. Better result with beta blocker prophylaxis than topamax. Will cont current tx plan. Will hold off on neuro referral at this time.       Follow-up Disposition: 3 months; sooner prn

## 2018-07-13 NOTE — PROGRESS NOTES
Chief Complaint   Patient presents with    Follow-up    Hypertension    Migraine     Patient states that she has not scheduled appointment with Neurology. Patient states that the current dosage of imitrex is effective and she doesn't feel as if she needs to see the neurologist.    Daisy Carroll     completed on 7-10-18    Medication Refill     1. Have you been to the ER, urgent care clinic since your last visit? Hospitalized since your last visit? No    2. Have you seen or consulted any other health care providers outside of the [address] [place] [address] since your last visit? Include any pap smears or colon screening.  No

## 2018-07-20 DIAGNOSIS — G43.809 OTHER MIGRAINE WITHOUT STATUS MIGRAINOSUS, NOT INTRACTABLE: ICD-10-CM

## 2018-07-20 DIAGNOSIS — R11.0 NAUSEA: ICD-10-CM

## 2018-07-20 DIAGNOSIS — R10.13 EPIGASTRIC ABDOMINAL PAIN: ICD-10-CM

## 2018-07-20 RX ORDER — PANTOPRAZOLE SODIUM 40 MG/1
40 TABLET, DELAYED RELEASE ORAL 2 TIMES DAILY
Qty: 180 TAB | Refills: 5 | Status: SHIPPED | OUTPATIENT
Start: 2018-07-20 | End: 2019-08-05 | Stop reason: SDUPTHER

## 2018-07-20 RX ORDER — ATENOLOL 25 MG/1
TABLET ORAL
Qty: 90 TAB | Refills: 5 | Status: SHIPPED | OUTPATIENT
Start: 2018-07-20 | End: 2019-08-05 | Stop reason: SDUPTHER

## 2018-07-24 NOTE — PROGRESS NOTES
Please inform patient that her labs are normal, near normal, or of no clinical concern.  SATISH Berry

## 2018-08-09 ENCOUNTER — TELEPHONE (OUTPATIENT)
Dept: FAMILY MEDICINE CLINIC | Age: 53
End: 2018-08-09

## 2018-08-09 DIAGNOSIS — M19.90 ARTHRITIS: Primary | ICD-10-CM

## 2018-08-09 DIAGNOSIS — M50.30 DDD (DEGENERATIVE DISC DISEASE), CERVICAL: ICD-10-CM

## 2018-08-09 DIAGNOSIS — M51.36 DDD (DEGENERATIVE DISC DISEASE), LUMBAR: ICD-10-CM

## 2018-08-09 NOTE — TELEPHONE ENCOUNTER
Pt called and stated that she needs to be seen for arthritis pain for referral. Pt stated that when she was last seen an appointment should've been made, but she has not received a call. Please advise.

## 2018-08-13 ENCOUNTER — TELEPHONE (OUTPATIENT)
Dept: FAMILY MEDICINE CLINIC | Age: 53
End: 2018-08-13

## 2018-08-13 ENCOUNTER — DOCUMENTATION ONLY (OUTPATIENT)
Dept: FAMILY MEDICINE CLINIC | Age: 53
End: 2018-08-13

## 2018-08-13 NOTE — TELEPHONE ENCOUNTER
Patient states on her last visit you mentioned you were going to refer her to a specialist (don't remember type but she know it was not neurology) and she haven't heard anything yet concerning appt.

## 2018-08-15 NOTE — TELEPHONE ENCOUNTER
Patient contacted, patient identified with two identifiers (Name & ). Patient aware of rheumatology referral and approval faxed to DR. Palacios [address]. Patient given contact info to schedule an appointment if she is not contacted.

## 2018-08-16 ENCOUNTER — TELEPHONE (OUTPATIENT)
Dept: FAMILY MEDICINE CLINIC | Age: 53
End: 2018-08-16

## 2018-08-16 NOTE — TELEPHONE ENCOUNTER
Pt called and stated that when she was seen in the office she found a paper stating that she needs a referral to physiologist instead of rheumatologist.

## 2018-09-24 DIAGNOSIS — Z79.890 POSTMENOPAUSAL HRT (HORMONE REPLACEMENT THERAPY): ICD-10-CM

## 2019-08-02 ENCOUNTER — TELEPHONE (OUTPATIENT)
Dept: FAMILY MEDICINE CLINIC | Age: 54
End: 2019-08-02

## 2019-08-02 NOTE — TELEPHONE ENCOUNTER
Calling for a referral to neurologist for back pain--has been seeing pain adam [facility]--they sent a request to us requesting pt be referred to dr Cindy Velasco,  Is it ok to do?   Referral  To efrem

## 2019-08-05 ENCOUNTER — OFFICE VISIT (OUTPATIENT)
Dept: FAMILY MEDICINE CLINIC | Age: 54
End: 2019-08-05

## 2019-08-05 VITALS
DIASTOLIC BLOOD PRESSURE: 82 MMHG | BODY MASS INDEX: 30.08 KG/M2 | SYSTOLIC BLOOD PRESSURE: 136 MMHG | TEMPERATURE: 98.2 F | OXYGEN SATURATION: 99 % | WEIGHT: 187.2 LBS | HEART RATE: 81 BPM | RESPIRATION RATE: 18 BRPM | HEIGHT: 66 IN

## 2019-08-05 DIAGNOSIS — Z12.11 SCREENING FOR COLON CANCER: ICD-10-CM

## 2019-08-05 DIAGNOSIS — G43.809 OTHER MIGRAINE WITHOUT STATUS MIGRAINOSUS, NOT INTRACTABLE: ICD-10-CM

## 2019-08-05 DIAGNOSIS — M51.36 DDD (DEGENERATIVE DISC DISEASE), LUMBAR: ICD-10-CM

## 2019-08-05 DIAGNOSIS — I10 ESSENTIAL HYPERTENSION: Primary | ICD-10-CM

## 2019-08-05 DIAGNOSIS — R10.13 EPIGASTRIC ABDOMINAL PAIN: ICD-10-CM

## 2019-08-05 DIAGNOSIS — G89.29 OTHER CHRONIC PAIN: ICD-10-CM

## 2019-08-05 DIAGNOSIS — Z80.0 FAMILY HISTORY OF COLON CANCER: ICD-10-CM

## 2019-08-05 DIAGNOSIS — M50.30 DDD (DEGENERATIVE DISC DISEASE), CERVICAL: ICD-10-CM

## 2019-08-05 DIAGNOSIS — R11.0 NAUSEA: ICD-10-CM

## 2019-08-05 DIAGNOSIS — Z11.59 NEED FOR HEPATITIS C SCREENING TEST: ICD-10-CM

## 2019-08-05 RX ORDER — SUMATRIPTAN 50 MG/1
TABLET, FILM COATED ORAL
Qty: 10 TAB | Refills: 0 | Status: SHIPPED | OUTPATIENT
Start: 2019-08-05 | End: 2019-10-11 | Stop reason: SDUPTHER

## 2019-08-05 RX ORDER — METHOCARBAMOL 500 MG/1
TABLET, FILM COATED ORAL 4 TIMES DAILY
COMMUNITY
End: 2022-01-31 | Stop reason: SDUPTHER

## 2019-08-05 RX ORDER — ONDANSETRON 4 MG/1
4 TABLET, ORALLY DISINTEGRATING ORAL
Qty: 30 TAB | Refills: 0 | Status: SHIPPED | OUTPATIENT
Start: 2019-08-05 | End: 2020-11-20 | Stop reason: SDUPTHER

## 2019-08-05 RX ORDER — PANTOPRAZOLE SODIUM 40 MG/1
40 TABLET, DELAYED RELEASE ORAL 2 TIMES DAILY
Qty: 180 TAB | Refills: 5 | Status: SHIPPED | OUTPATIENT
Start: 2019-08-05

## 2019-08-05 RX ORDER — ATENOLOL 25 MG/1
TABLET ORAL
Qty: 90 TAB | Refills: 5 | Status: SHIPPED | OUTPATIENT
Start: 2019-08-05 | End: 2020-11-04 | Stop reason: SDUPTHER

## 2019-08-05 NOTE — PROGRESS NOTES
Chief Complaint   Patient presents with    Medication Refill     1. Have you been to the ER, urgent care clinic since your last visit? Hospitalized since your last visit? No    2. Have you seen or consulted any other health care providers outside of the [address] [place] [address] since your last visit? Include any pap smears or colon screening.  No

## 2019-08-05 NOTE — PROGRESS NOTES
HPI  Pt of Dr Irma Landin. Has been seen pain management specialist for lower back pain. Per pain management, she now needs to see a neurosurgeon and is recommended to go to see Dr Aide Horn. Has  and needs a referral for this    Also requesting refills on her medications. Has migraines. Takes Sumatriptan and says that this is mostly helpful    Has hx of nausea- was worked up by GI and was told it was GERD. Feels that Protonix works okay. Hx of hypertension and feels that it is well controlled by atenolol    Had colonoscopy 5 years ago. Needs referral to GI    Going to ask about mammogram and will call back if she needs order. Told pt that she can call and I will put in order if she does. Has not had lab work done since last year          Past Medical History  Past Medical History:   Diagnosis Date    Back problem     Constipation     Hypertension     Joint pain        Surgical History  Past Surgical History:   Procedure Laterality Date    HX HYSTERECTOMY          Medications  Current Outpatient Medications   Medication Sig Dispense Refill    atenolol (TENORMIN) 25 mg tablet take 1 tablet by mouth once daily 90 Tab 5    pantoprazole (PROTONIX) 40 mg tablet Take 1 Tab by mouth two (2) times a day. 180 Tab 5    SUMAtriptan (IMITREX) 50 mg tablet TAKE 2 TABLETS BY MOUTH ONCE DAILY AS NEEDED FOR MIGRAINE. DO NOT TAKE WITH TRAMADOL 10 Tab 0    ondansetron (ZOFRAN ODT) 4 mg disintegrating tablet Take 1 Tab by mouth every eight (8) hours as needed for Nausea. 30 Tab 0    methocarbamol (ROBAXIN) 500 mg tablet Take  by mouth four (4) times daily.  conjugated estrogens (PREMARIN) 0.9 mg tablet take 1 tablet by mouth once daily 90 Tab 3    traMADol (ULTRAM) 50 mg tablet take 1 tablet by mouth twice a day  0    lidocaine (LIDODERM) 5 % by TransDERmal route every twenty-four (24) hours. Apply patch to the affected area for 12 hours a day and remove for 12 hours a day.       diclofenac (VOLTAREN) 1 % gel apply 2.5 grams to the affected areas 3-4 times daily  2       Allergies  Allergies   Allergen Reactions    Prednisone Other (comments)       Family History  Family History   Problem Relation Age of Onset    Hypertension Mother     Cancer Father         Prostate    Diabetes Father     Hypertension Father     Breast Cancer Maternal Aunt     Breast Cancer Paternal Aunt        Social History  Social History     Socioeconomic History    Marital status:      Spouse name: Not on file    Number of children: Not on file    Years of education: Not on file    Highest education level: Not on file   Occupational History    Not on file   Social Needs    Financial resource strain: Not on file    Food insecurity:     Worry: Not on file     Inability: Not on file    Transportation needs:     Medical: Not on file     Non-medical: Not on file   Tobacco Use    Smoking status: Former Smoker    Smokeless tobacco: Never Used   Substance and Sexual Activity    Alcohol use:  Yes     Alcohol/week: 1.0 standard drinks     Types: 1 Cans of beer per week    Drug use: No    Sexual activity: Yes   Lifestyle    Physical activity:     Days per week: Not on file     Minutes per session: Not on file    Stress: Not on file   Relationships    Social connections:     Talks on phone: Not on file     Gets together: Not on file     Attends [employer] service: Not on file     Active member of club or organization: Not on file     Attends meetings of clubs or organizations: Not on file     Relationship status: Not on file    Intimate partner violence:     Fear of current or ex partner: Not on file     Emotionally abused: Not on file     Physically abused: Not on file     Forced sexual activity: Not on file   Other Topics Concern    Not on file   Social History Narrative    Not on file       Problem List  Patient Active Problem List   Diagnosis Code    DDD (degenerative disc disease), lumbar M51.36    DDD (degenerative disc disease), cervical M50.30    Essential hypertension I10    Migraine G43.909    Nausea R11.0    Epigastric abdominal pain R10.13    Other chronic pain G89.29    Family history of colon cancer Z80.0    Need for hepatitis C screening test Z11.59    Screening for colon cancer Z12.11       Review of Systems  Review of Systems   Constitutional: Negative. Respiratory: Negative. Cardiovascular: Negative. Gastrointestinal: Positive for heartburn and nausea. Musculoskeletal:        Chronic lower back pain that radiates into lower extremities bilaterally       Vital Signs  Vitals:    08/05/19 1455   BP: 136/82   Pulse: 81   Resp: 18   Temp: 98.2 °F (36.8 °C)   TempSrc: Oral   SpO2: 99%   Weight: 187 lb 3.2 oz (84.9 kg)   Height: 5' 5.5\" (1.664 m)   PainSc:   7   PainLoc: Back       Physical Exam  Physical Exam   Constitutional: She is oriented to person, place, and time. She appears well-developed and well-nourished. No distress. Cardiovascular: Normal rate, regular rhythm and normal heart sounds. Pulmonary/Chest: Effort normal and breath sounds normal.   Musculoskeletal: Normal range of motion. Verbalizing chronic lower back pain with pain radiating into her lower extremities   Neurological: She is alert and oriented to person, place, and time. Skin: Skin is warm and dry. Nursing note and vitals reviewed.       Diagnostics  Orders Placed This Encounter    HEPATITIS C AB     Standing Status:   Future     Standing Expiration Date:   8/5/2020    CBC WITH AUTOMATED DIFF     Standing Status:   Future     Standing Expiration Date:   8/5/2020    LIPID PANEL     Standing Status:   Future     Standing Expiration Date:   5/2/0301    METABOLIC PANEL, COMPREHENSIVE     Standing Status:   Future     Standing Expiration Date:   8/5/2020    T4, FREE     Standing Status:   Future     Standing Expiration Date:   2/5/2020    REFERRAL TO NEUROSURGERY     Referral Priority:   Routine Referral Type:   Consultation     Referral Reason:   Specialty Services Required     Referred to Provider:   Sruthi López MD     Requested Specialty:   Neurosurgery     Number of Visits Requested:   1    REFERRAL TO GASTROENTEROLOGY     Referral Priority:   Routine     Referral Type:   Consultation     Referral Reason:   Specialty Services Required     Number of Visits Requested:   1    atenolol (TENORMIN) 25 mg tablet     Sig: take 1 tablet by mouth once daily     Dispense:  90 Tab     Refill:  5    pantoprazole (PROTONIX) 40 mg tablet     Sig: Take 1 Tab by mouth two (2) times a day. Dispense:  180 Tab     Refill:  5    SUMAtriptan (IMITREX) 50 mg tablet     Sig: TAKE 2 TABLETS BY MOUTH ONCE DAILY AS NEEDED FOR MIGRAINE. DO NOT TAKE WITH TRAMADOL     Dispense:  10 Tab     Refill:  0    ondansetron (ZOFRAN ODT) 4 mg disintegrating tablet     Sig: Take 1 Tab by mouth every eight (8) hours as needed for Nausea. Dispense:  30 Tab     Refill:  0    methocarbamol (ROBAXIN) 500 mg tablet     Sig: Take  by mouth four (4) times daily. Results  Results for orders placed or performed during the hospital encounter of 07/10/18   CBC WITH AUTOMATED DIFF   Result Value Ref Range    WBC 6.2 4.6 - 13.2 K/uL    RBC 4.52 4.20 - 5.30 M/uL    HGB 13.0 12.0 - 16.0 g/dL    HCT 42.0 35.0 - 45.0 %    MCV 92.9 74.0 - 97.0 FL    MCH 28.8 24.0 - 34.0 PG    MCHC 31.0 31.0 - 37.0 g/dL    RDW 13.4 11.6 - 14.5 %    PLATELET 622 665 - 584 K/uL    MPV 11.3 9.2 - 11.8 FL    NEUTROPHILS 68 40 - 73 %    LYMPHOCYTES 23 21 - 52 %    MONOCYTES 6 3 - 10 %    EOSINOPHILS 2 0 - 5 %    BASOPHILS 1 0 - 2 %    ABS. NEUTROPHILS 4.3 1.8 - 8.0 K/UL    ABS. LYMPHOCYTES 1.4 0.9 - 3.6 K/UL    ABS. MONOCYTES 0.3 0.05 - 1.2 K/UL    ABS. EOSINOPHILS 0.1 0.0 - 0.4 K/UL    ABS.  BASOPHILS 0.0 0.0 - 0.1 K/UL    DF AUTOMATED     METABOLIC PANEL, COMPREHENSIVE   Result Value Ref Range    Sodium 143 136 - 145 mmol/L    Potassium 4.6 3.5 - 5.5 mmol/L    Chloride 107 100 - 108 mmol/L    CO2 29 21 - 32 mmol/L    Anion gap 7 3.0 - 18 mmol/L    Glucose 98 74 - 99 mg/dL    BUN 14 7.0 - 18 MG/DL    Creatinine 0.81 0.6 - 1.3 MG/DL    BUN/Creatinine ratio 17 12 - 20      GFR est AA >60 >60 ml/min/1.73m2    GFR est non-AA >60 >60 ml/min/1.73m2    Calcium 8.5 8.5 - 10.1 MG/DL    Bilirubin, total 0.5 0.2 - 1.0 MG/DL    ALT (SGPT) 19 13 - 56 U/L    AST (SGOT) 16 15 - 37 U/L    Alk. phosphatase 66 45 - 117 U/L    Protein, total 6.4 6.4 - 8.2 g/dL    Albumin 3.5 3.4 - 5.0 g/dL    Globulin 2.9 2.0 - 4.0 g/dL    A-G Ratio 1.2 0.8 - 1.7     LIPID PANEL   Result Value Ref Range    LIPID PROFILE          Cholesterol, total 152 <200 MG/DL    Triglyceride 141 <150 MG/DL    HDL Cholesterol 65 (H) 40 - 60 MG/DL    LDL, calculated 58.8 0 - 100 MG/DL    VLDL, calculated 28.2 MG/DL    CHOL/HDL Ratio 2.3 0 - 5.0     T4, FREE   Result Value Ref Range    T4, Free 1.0 0.7 - 1.5 NG/DL   TSH 3RD GENERATION   Result Value Ref Range    TSH 1.30 0.36 - 3.74 uIU/mL       Assessment and Plan  Diagnoses and all orders for this visit:    1. Essential hypertension  -     atenolol (TENORMIN) 25 mg tablet; take 1 tablet by mouth once daily  -     CBC WITH AUTOMATED DIFF; Future  -     LIPID PANEL; Future  -     METABOLIC PANEL, COMPREHENSIVE; Future  -     T4, FREE; Future  Continue with present plan of care    2. Epigastric abdominal pain  -     pantoprazole (PROTONIX) 40 mg tablet; Take 1 Tab by mouth two (2) times a day. Continue with present plan of care but encourage patient to follow-up with GI if she continues to have nausea and abdominal pain    3. Nausea  -     pantoprazole (PROTONIX) 40 mg tablet; Take 1 Tab by mouth two (2) times a day. -     ondansetron (ZOFRAN ODT) 4 mg disintegrating tablet; Take 1 Tab by mouth every eight (8) hours as needed for Nausea.     4. Other migraine without status migrainosus, not intractable  -     SUMAtriptan (IMITREX) 50 mg tablet; TAKE 2 TABLETS BY MOUTH ONCE DAILY AS NEEDED FOR MIGRAINE. DO NOT TAKE WITH TRAMADOL  Continue with present plan of care    5. DDD (degenerative disc disease), cervical  -     REFERRAL TO NEUROSURGERY    6. DDD (degenerative disc disease), lumbar  -     REFERRAL TO NEUROSURGERY    7. Other chronic pain  -     REFERRAL TO NEUROSURGERY    8. Family history of colon cancer  -     REFERRAL TO GASTROENTEROLOGY    9. Screening for colon cancer  -     REFERRAL TO GASTROENTEROLOGY    10. Need for hepatitis C screening test  -     HEPATITIS C AB; Future        After care summary printed and reviewed with patient. Plan reviewed with patient. Questions answered. Patient verbalized understanding of plan and is in agreement with plan. Patient to follow up in 3 months or earlier if symptoms worsen. Encouraged the use of my chart.     Sharyle Nim, FNP-C

## 2019-09-19 PROBLEM — Z12.11 SCREENING FOR COLON CANCER: Status: RESOLVED | Noted: 2019-08-05 | Resolved: 2019-09-19

## 2019-10-11 ENCOUNTER — HOSPITAL ENCOUNTER (OUTPATIENT)
Dept: LAB | Age: 54
Discharge: HOME | End: 2019-10-11
Payer: OTHER GOVERNMENT

## 2019-10-11 DIAGNOSIS — Z11.59 NEED FOR HEPATITIS C SCREENING TEST: ICD-10-CM

## 2019-10-11 DIAGNOSIS — I10 ESSENTIAL HYPERTENSION: ICD-10-CM

## 2019-10-11 LAB
ALBUMIN SERPL-MCNC: 3.5 G/DL (ref 3.4–5)
ALBUMIN/GLOB SERPL: 1.1 {RATIO} (ref 0.8–1.7)
ALP SERPL-CCNC: 72 U/L (ref 45–117)
ALT SERPL-CCNC: 15 U/L (ref 13–56)
ANION GAP SERPL CALC-SCNC: 5 MMOL/L (ref 3–18)
AST SERPL-CCNC: 13 U/L (ref 10–38)
BASOPHILS # BLD: 0 K/UL (ref 0–0.1)
BASOPHILS NFR BLD: 1 % (ref 0–2)
BILIRUB SERPL-MCNC: 0.2 MG/DL (ref 0.2–1)
BUN SERPL-MCNC: 12 MG/DL (ref 7–18)
BUN/CREAT SERPL: 15 (ref 12–20)
CALCIUM SERPL-MCNC: 8.7 MG/DL (ref 8.5–10.1)
CHLORIDE SERPL-SCNC: 109 MMOL/L (ref 100–111)
CHOLEST SERPL-MCNC: 163 MG/DL
CO2 SERPL-SCNC: 29 MMOL/L (ref 21–32)
CREAT SERPL-MCNC: 0.79 MG/DL (ref 0.6–1.3)
DIFFERENTIAL METHOD BLD: NORMAL
EOSINOPHIL # BLD: 0.1 K/UL (ref 0–0.4)
EOSINOPHIL NFR BLD: 2 % (ref 0–5)
ERYTHROCYTE [DISTWIDTH] IN BLOOD BY AUTOMATED COUNT: 12.8 % (ref 11.6–14.5)
GLOBULIN SER CALC-MCNC: 3.2 G/DL (ref 2–4)
GLUCOSE SERPL-MCNC: 91 MG/DL (ref 74–99)
HCT VFR BLD AUTO: 39.7 % (ref 35–45)
HDLC SERPL-MCNC: 67 MG/DL (ref 40–60)
HDLC SERPL: 2.4 {RATIO} (ref 0–5)
HGB BLD-MCNC: 12.8 G/DL (ref 12–16)
LDLC SERPL CALC-MCNC: 77.8 MG/DL (ref 0–100)
LIPID PROFILE,FLP: ABNORMAL
LYMPHOCYTES # BLD: 1.5 K/UL (ref 0.9–3.6)
LYMPHOCYTES NFR BLD: 24 % (ref 21–52)
MCH RBC QN AUTO: 28.4 PG (ref 24–34)
MCHC RBC AUTO-ENTMCNC: 32.2 G/DL (ref 31–37)
MCV RBC AUTO: 88 FL (ref 74–97)
MONOCYTES # BLD: 0.3 K/UL (ref 0.05–1.2)
MONOCYTES NFR BLD: 5 % (ref 3–10)
NEUTS SEG # BLD: 4.5 K/UL (ref 1.8–8)
NEUTS SEG NFR BLD: 68 % (ref 40–73)
PLATELET # BLD AUTO: 194 K/UL (ref 135–420)
PMV BLD AUTO: 11.1 FL (ref 9.2–11.8)
POTASSIUM SERPL-SCNC: 4.5 MMOL/L (ref 3.5–5.5)
PROT SERPL-MCNC: 6.7 G/DL (ref 6.4–8.2)
RBC # BLD AUTO: 4.51 M/UL (ref 4.2–5.3)
SODIUM SERPL-SCNC: 143 MMOL/L (ref 136–145)
T4 FREE SERPL-MCNC: 1 NG/DL (ref 0.7–1.5)
TRIGL SERPL-MCNC: 91 MG/DL (ref ?–150)
VLDLC SERPL CALC-MCNC: 18.2 MG/DL
WBC # BLD AUTO: 6.6 K/UL (ref 4.6–13.2)

## 2019-10-11 PROCEDURE — 86803 HEPATITIS C AB TEST: CPT

## 2019-10-11 PROCEDURE — 80053 COMPREHEN METABOLIC PANEL: CPT

## 2019-10-11 PROCEDURE — 80061 LIPID PANEL: CPT

## 2019-10-11 PROCEDURE — 36415 COLL VENOUS BLD VENIPUNCTURE: CPT

## 2019-10-11 PROCEDURE — 84439 ASSAY OF FREE THYROXINE: CPT

## 2019-10-11 PROCEDURE — 85025 COMPLETE CBC W/AUTO DIFF WBC: CPT

## 2019-10-14 LAB
HCV AB SER IA-ACNC: 0.08 INDEX
HCV AB SERPL QL IA: NEGATIVE
HCV COMMENT,HCGAC: NORMAL

## 2019-10-15 NOTE — PROGRESS NOTES
Please let pt know that lab work all looks normal. Please ask her to make a follow up appt with Dr Hayder Salomon

## 2019-10-16 DIAGNOSIS — G43.809 OTHER MIGRAINE WITHOUT STATUS MIGRAINOSUS, NOT INTRACTABLE: ICD-10-CM

## 2019-10-16 RX ORDER — SUMATRIPTAN 50 MG/1
TABLET, FILM COATED ORAL
Qty: 10 TAB | Refills: 0 | Status: SHIPPED | OUTPATIENT
Start: 2019-10-16 | End: 2019-11-01 | Stop reason: SDUPTHER

## 2019-10-22 NOTE — PROGRESS NOTES
I have attempted without success to contact this patient by phone to discuss lab results, will try again later and I left a message on answering machine.

## 2019-10-27 ENCOUNTER — PATIENT OUTREACH (OUTPATIENT)
Dept: FAMILY MEDICINE CLINIC | Age: 54
End: 2019-10-27

## 2019-10-27 ENCOUNTER — TELEPHONE (OUTPATIENT)
Dept: FAMILY MEDICINE CLINIC | Age: 54
End: 2019-10-27

## 2019-10-27 DIAGNOSIS — Z12.11 SCREEN FOR COLON CANCER: Primary | ICD-10-CM

## 2019-10-27 NOTE — PROGRESS NOTES
health screening:    Ms Roslyn Rocha will be obtaining her well woman visit with her gynecologist and will have the report faxed to Dr. Maribel Leblanc for her records. No longer obtains pap smears due to hysterectomy but still participates in well woman visits periodically.

## 2019-11-01 ENCOUNTER — OFFICE VISIT (OUTPATIENT)
Dept: FAMILY MEDICINE CLINIC | Age: 54
End: 2019-11-01

## 2019-11-01 VITALS
DIASTOLIC BLOOD PRESSURE: 80 MMHG | BODY MASS INDEX: 29.86 KG/M2 | TEMPERATURE: 97.7 F | WEIGHT: 185.8 LBS | SYSTOLIC BLOOD PRESSURE: 121 MMHG | HEIGHT: 66 IN | RESPIRATION RATE: 18 BRPM | HEART RATE: 66 BPM

## 2019-11-01 DIAGNOSIS — G43.809 OTHER MIGRAINE WITHOUT STATUS MIGRAINOSUS, NOT INTRACTABLE: ICD-10-CM

## 2019-11-01 DIAGNOSIS — R89.8 ABNORMAL BONE MARROW EXAMINATION: ICD-10-CM

## 2019-11-01 DIAGNOSIS — G57.61 MORTON'S NEUROMA OF RIGHT FOOT: ICD-10-CM

## 2019-11-01 DIAGNOSIS — I10 ESSENTIAL HYPERTENSION: Primary | ICD-10-CM

## 2019-11-01 DIAGNOSIS — Z23 ENCOUNTER FOR IMMUNIZATION: ICD-10-CM

## 2019-11-01 DIAGNOSIS — N95.9 MENOPAUSAL AND PERIMENOPAUSAL DISORDER: ICD-10-CM

## 2019-11-01 RX ORDER — TRAMADOL HYDROCHLORIDE 50 MG/1
1 TABLET ORAL
COMMUNITY

## 2019-11-01 RX ORDER — TIZANIDINE HYDROCHLORIDE 2 MG/1
CAPSULE, GELATIN COATED ORAL
COMMUNITY
End: 2022-01-31

## 2019-11-01 RX ORDER — SUMATRIPTAN 50 MG/1
TABLET, FILM COATED ORAL
Qty: 54 TAB | Refills: 4 | Status: SHIPPED | OUTPATIENT
Start: 2019-11-01 | End: 2019-11-15 | Stop reason: ALTCHOICE

## 2019-11-01 NOTE — PROGRESS NOTES
Chief Complaint   Patient presents with    Hypertension     follow up    Labs     completed 10/11/19    Medication Refill         HPI    Evelio Arreola is a 47 y.o. female presenting today for  3 months  follow up of htn. Patient continues to see pain management for her chronic back pain. She is otherwise doing reasonably well. Patient had labs on 10/11/19. Labs reviewed in detail with patient       Patient does need medication refills today. New concerns today: pt will need a referral to podiatry for possible cardoso's neuroma. She will call back with a name. Patient desires flu shot today. Patient does need a bone density test.  Review of Systems   Constitutional: Negative. HENT: Negative. Respiratory: Negative. Cardiovascular: Negative. Musculoskeletal: Positive for back pain. Foot pain   All other systems reviewed and are negative. Physical Exam  Physical Exam   Nursing note and vitals reviewed. Constitutional: She is oriented to person, place, and time. She appears well-developed and well-nourished. HENT:   Head: Normocephalic and atraumatic. Right Ear: External ear normal.   Left Ear: External ear normal.   Nose: Nose normal.   Eyes: Conjunctivae and EOM are normal.   Neck: Normal range of motion. Neck supple. No JVD present. Carotid bruit is not present. No thyromegaly present. Cardiovascular: Normal rate, regular rhythm, normal heart sounds and intact distal pulses. Exam reveals no gallop and no friction rub. No murmur heard. Pulmonary/Chest: Effort normal and breath sounds normal. She has no wheezes. She has no rhonchi. She has no rales. Abdominal: Soft. Bowel sounds are normal.   Musculoskeletal: Normal range of motion. Neurological: She is alert and oriented to person, place, and time. Coordination normal.   Skin: Skin is warm and dry. Psychiatric: She has a normal mood and affect.  Her behavior is normal. Judgment and thought content normal.     Diagnoses and all orders for this visit:    1. Essential hypertension  Stable. Continue present treatment plan    2. Other migraine without status migrainosus, not intractable  -     SUMAtriptan (IMITREX) 50 mg tablet; TAKE 2 TABLETS BY MOUTH ONCE DAILY AS NEEDED FOR MIGRAINE. DO NOT TAKE WITH TRAMADOL  Stable. Continue present treatment plan    3. Abnormal bone marrow examination  -     VITAMIN D, 25 HYDROXY; Future    4. Menopausal and perimenopausal disorder  -     DEXA BONE DENSITY STUDY AXIAL; Future    5. Rasheed's neuroma of right foot  Will refer to podiatry. Patient will call back with the name of the specialist she would like to see. 6. Encounter for immunization  -     INFLUENZA VIRUS VAC QUAD,SPLIT,PRESV FREE SYRINGE IM      Follow-up and Dispositions    · Return in about 3 months (around 2/1/2020) for high blood pressure.

## 2019-11-01 NOTE — PROGRESS NOTES
Jessica Birmingham presents today for   Chief Complaint   Patient presents with    Hypertension     follow up    Labs     completed 10/11/19    Medication Refill       Jessica Birmingham preferred language for health care discussion is english/other. Is someone accompanying this pt? no    Is the patient using any DME equipment during [address]? no    Depression Screening:  3 most recent PHQ Screens 8/5/2019   Little interest or pleasure in doing things Not at all   Feeling down, depressed, irritable, or hopeless Not at all   Total Score PHQ 2 0       Learning Assessment:  Learning Assessment 8/5/2019   PRIMARY LEARNER Patient   HIGHEST LEVEL OF EDUCATION - PRIMARY LEARNER  4 YEARS OF COLLEGE   BARRIERS PRIMARY LEARNER NONE   CO-LEARNER CAREGIVER -   PRIMARY LANGUAGE ENGLISH   LEARNER PREFERENCE PRIMARY DEMONSTRATION     LISTENING     READING   ANSWERED BY self   RELATIONSHIP SELF       Abuse Screening:  Abuse Screening Questionnaire 11/3/2017   Do you ever feel afraid of your partner? N   Are you in a relationship with someone who physically or mentally threatens you? N   Is it safe for you to go home? Y       Generalized Anxiety  No flowsheet data found. Health Maintenance Due   Topic Date Due    DTaP/Tdap/Td series (1 - Tdap) 03/18/1986    Shingrix Vaccine Age 50> (1 of 2) 03/18/2015    FOBT Q 1 YEAR AGE 50-75  03/18/2015    Influenza Age 9 to Adult  08/01/2019    BREAST CANCER SCRN MAMMOGRAM  11/21/2019   . Health Maintenance reviewed and discussed and ordered per Provider. Jessica Birmingham is updated on all     Coordination of Care:  1. Have you been to the ER, urgent care clinic since your last visit? Hospitalized since your last visit? no    2. Have you seen or consulted any other health care providers outside of the [address] [place] [address] since your last visit? Include any pap smears or colon screening. no      Advance Directive:  1. Do you have an advance directive in place? Patient Reply:no    2. If not, would you like material regarding how to put one in place? Patient Reply: no    Shayla Enamorado 1965 female who presents for routine immunizations. Patient denies any symptoms , reactions or allergies that would exclude them from being immunized today. Risks and adverse reactions were discussed and the VIS was given to them. All questions were addressed. Order placed for Flu,  per Verbal Order from Dr. Firman Boas with read back. Patient was observed for 15 min post injection. There were no reactions observed.     Rose Marie Quintero

## 2019-11-01 NOTE — PATIENT INSTRUCTIONS
Vaccine Information Statement    Influenza (Flu) Vaccine (Inactivated or Recombinant): What You Need to Know    Many Vaccine Information Statements are available in Romanian and other languages. See www.immunize.org/vis  Hojas de información sobre vacunas están disponibles en español y en muchos otros idiomas. Visite www.immunize.org/vis    1. Why get vaccinated? Influenza vaccine can prevent influenza (flu). Flu is a contagious disease that spreads around the [facility] every year, usually between October and May. Anyone can get the flu, but it is more dangerous for some people. Infants and young children, people 72years of age and older, pregnant women, and people with certain health conditions or a weakened immune system are at greatest risk of flu complications. Pneumonia, bronchitis, sinus infections and ear infections are examples of flu-related complications. If you have a medical condition, such as heart disease, cancer or diabetes, flu can make it worse. Flu can cause fever and chills, sore throat, muscle aches, fatigue, cough, headache, and runny or stuffy nose. Some people may have vomiting and diarrhea, though this is more common in children than adults. Each year thousands of people in the [facility] die from flu, and many more are hospitalized. Flu vaccine prevents millions of illnesses and flu-related visits to the doctor each year. 2. Influenza vaccines     CDC recommends everyone 10months of age and older get vaccinated every flu season. Children 6 months through 6years of age may need 2 doses during a single flu season. Everyone else needs only 1 dose each flu season. It takes about 2 weeks for protection to develop after vaccination. There are many flu viruses, and they are always changing. Each year a new flu vaccine is made to protect against three or four viruses that are likely to cause disease in the upcoming flu season.  Even when the vaccine doesnt exactly match these viruses, it may still provide some protection. Influenza vaccine does not cause flu. Influenza vaccine may be given at the same time as other vaccines. 3. Talk with your health care provider    Tell your vaccine provider if the person getting the vaccine:   Has had an allergic reaction after a previous dose of influenza vaccine, or has any severe, life-threatening allergies.  Has ever had Guillain-Barré Syndrome (also called GBS). In some cases, your health care provider may decide to postpone influenza vaccination to a future visit. People with minor illnesses, such as a cold, may be vaccinated. People who are moderately or severely ill should usually wait until they recover before getting influenza vaccine. Your health care provider can give you more information. 4. Risks of a reaction     Soreness, redness, and swelling where shot is given, fever, muscle aches, and headache can happen after influenza vaccine.  There may be a very small increased risk of Guillain-Barré Syndrome (GBS) after inactivated influenza vaccine (the flu shot). Shea Perez children who get the flu shot along with pneumococcal vaccine (PCV13), and/or DTaP vaccine at the same time might be slightly more likely to have a seizure caused by fever. Tell your health care provider if a child who is getting flu vaccine has ever had a seizure. People sometimes faint after medical procedures, including vaccination. Tell your provider if you feel dizzy or have vision changes or ringing in the ears. As with any medicine, there is a very remote chance of a vaccine causing a severe allergic reaction, other serious injury, or death. 5. What if there is a serious problem? An allergic reaction could occur after the vaccinated person leaves the clinic.  If you see signs of a severe allergic reaction (hives, swelling of the face and throat, difficulty breathing, a fast heartbeat, dizziness, or weakness), call 9-1-1 and get the person to the nearest hospital.    For other signs that concern you, call your health care provider. Adverse reactions should be reported to the Vaccine Adverse Event Reporting System (VAERS). Your health care provider will usually file this report, or you can do it yourself. Visit the VAERS website at www.vaers. [facility].gov or call 6-534.707.4354. VAERS is only for reporting reactions, and VAERS staff do not give medical advice. 6. The National Vaccine Injury Compensation Program    The [facility] Vaccine Injury Compensation Program (VICP) is a federal program that was created to compensate people who may have been injured by certain vaccines. Visit the VICP website at www.hrsa.gov/vaccinecompensation or call 7-645.244.8535 to learn about the program and about filing a claim. There is a time limit to file a claim for compensation. 7. How can I learn more?  Ask your health care provider.  Call your local or state health department.  Contact the Centers for Disease Control and Prevention (CDC):  - Call 8-713.565.6354 (1-800-CDC-INFO) or  - Visit CDCs influenza website at www.cdc.gov/flu    Vaccine Information Statement (Interim)  Inactivated Influenza Vaccine   8/15/2019  [address] 576YR-21   Department of Health and Human Services  Centers for Disease Control and Prevention    Office Use Only

## 2019-11-15 ENCOUNTER — OFFICE VISIT (OUTPATIENT)
Dept: NEUROLOGY | Age: 54
End: 2019-11-15

## 2019-11-15 ENCOUNTER — HOSPITAL ENCOUNTER (OUTPATIENT)
Dept: MAMMOGRAPHY | Age: 54
Discharge: HOME | End: 2019-11-15
Attending: OBSTETRICS & GYNECOLOGY
Payer: OTHER GOVERNMENT

## 2019-11-15 VITALS
BODY MASS INDEX: 29.41 KG/M2 | DIASTOLIC BLOOD PRESSURE: 80 MMHG | WEIGHT: 183 LBS | SYSTOLIC BLOOD PRESSURE: 120 MMHG | OXYGEN SATURATION: 99 % | TEMPERATURE: 99.1 F | RESPIRATION RATE: 18 BRPM | HEART RATE: 92 BPM | HEIGHT: 66 IN

## 2019-11-15 DIAGNOSIS — R42 LIGHTHEADEDNESS: ICD-10-CM

## 2019-11-15 DIAGNOSIS — H53.9 VISUAL DISTURBANCE: ICD-10-CM

## 2019-11-15 DIAGNOSIS — Z12.31 VISIT FOR SCREENING MAMMOGRAM: ICD-10-CM

## 2019-11-15 DIAGNOSIS — R51.9 WORSENING HEADACHES: Primary | ICD-10-CM

## 2019-11-15 PROCEDURE — 77067 SCR MAMMO BI INCL CAD: CPT

## 2019-11-15 RX ORDER — RIZATRIPTAN BENZOATE 5 MG/1
TABLET ORAL
Qty: 9 TAB | Refills: 3 | Status: SHIPPED | OUTPATIENT
Start: 2019-11-15 | End: 2019-12-17 | Stop reason: SDUPTHER

## 2019-11-15 RX ORDER — ZONISAMIDE 25 MG/1
CAPSULE ORAL
Qty: 60 CAP | Refills: 2 | Status: SHIPPED | OUTPATIENT
Start: 2019-11-15 | End: 2019-12-17 | Stop reason: SDUPTHER

## 2019-11-15 NOTE — PROGRESS NOTES
[facility]  [address], [address], [facility], [address]  [address] [place], [address]  Office:  [phone]  Fax: [phone]    Referring: Roger Sung MD    Chief Complaint   Patient presents with    Migraine     Hx of migraines    New Patient       HPI: This is a 59-year-old female who presents for new patient evaluation with chief complaint of headaches. Headache started in her teens. They later got worse when she was in college. Headaches seemed to be associated to her menstrual cycle. Significant history of endometriosis with hysterectomy in 2013. She is currently on hormone replacement therapy. Headaches seem to slack off after hysterectomy. Currently having headaches and cycles. She can have several headache days a week and then go several weeks without having headaches. She is currently in a cycle of headache. Endorses triggers including heat and weather. Endorses worsening headaches in the fall and winter. Headaches can be generalized. She tells me of worsening headache across the eyes. Headache can be on both sides of the head. Denies sudden onset headache. Head pain described as pressure and throbbing pain. Headache can last greater than 4 hours. Headaches associated with significant nausea. Positive light and sound sensitivity. Positive lightheadedness. Positive blurred vision. These are intermittent. Denies vision loss or aura. Denies scotoma. Denies weakness. Denies shortness of breath or chest pain. Denies passing out. Denies conjunctival injection, lacrimation, or rhinorrhea. In the past she has taken Zofran for the nausea with improvement. She is currently prescribed this from her primary care provider. In the past she has been on Topamax with improvement in headaches although she did not tolerate the side effect of brain fogginess.   She has also been on atenolol with improvement from headaches but headaches later returned. She remains on atenolol for blood pressure control. She takes Imitrex for abortive headache therapy. Denies routine use of over-the-counter analgesics. She tells me she does not tolerate NSAIDs. Endorses allergy to prednisone which included hallucinations. She is also on tramadol for back pain. She is undergoing evaluation from neurosurgery for back pain. Seeing Dr. Peyton Flannery. Recently had MRI of the spine. Currently having lumbar spine epidural for back pain. She is seen by pain management Dr. Loly Galicia. Denies recent head imaging. She endorses unremarkable head imaging in the record of this in the office. Unclear how long ago this was. She endorses poor sleep. Endorses waking up in the middle the night. Positive snoring. She tries to sleep 6 to 7 hours a night. She has never had a sleep study. Positive family history of migraines. Denies tobacco use. Denies routine exercise. No other concerns at this time. Social History     Socioeconomic History    Marital status:      Spouse name: Not on file    Number of children: Not on file    Years of education: Not on file    Highest education level: Not on file   Occupational History    Not on file   Social Needs    Financial resource strain: Not on file    Food insecurity:     Worry: Not on file     Inability: Not on file    Transportation needs:     Medical: Not on file     Non-medical: Not on file   Tobacco Use    Smoking status: Former Smoker    Smokeless tobacco: Never Used   Substance and Sexual Activity    Alcohol use:  Yes     Alcohol/week: 1.0 standard drinks     Types: 1 Cans of beer per week    Drug use: No    Sexual activity: Yes   Lifestyle    Physical activity:     Days per week: Not on file     Minutes per session: Not on file    Stress: Not on file   Relationships    Social connections:     Talks on phone: Not on file     Gets together: Not on file     Attends Zoroastrian service: Not on file     Active member of club or organization: Not on file     Attends meetings of clubs or organizations: Not on file     Relationship status: Not on file    Intimate partner violence:     Fear of current or ex partner: Not on file     Emotionally abused: Not on file     Physically abused: Not on file     Forced sexual activity: Not on file   Other Topics Concern    Not on file   Social History Narrative    Not on file       Family History   Problem Relation Age of Onset    Hypertension Mother     Cancer Father         Prostate    Diabetes Father     Hypertension Father     Breast Cancer Maternal Aunt     Breast Cancer Paternal Aunt        Current Outpatient Medications   Medication Sig Dispense Refill    zonisamide (ZONEGRAN) 25 mg capsule Take one tablet nightly for a week then increase to two tabs nightly thereafter 60 Cap 2    rizatriptan (MAXALT) 5 mg tablet One tab at headache onset. May repeat in 2 hours if needed. Max two tabs in 24 hours. 9 Tab 3    traMADol (ULTRAM) 50 mg tablet Take 1 Tab by mouth.  atenolol (TENORMIN) 25 mg tablet take 1 tablet by mouth once daily 90 Tab 5    pantoprazole (PROTONIX) 40 mg tablet Take 1 Tab by mouth two (2) times a day. 180 Tab 5    ondansetron (ZOFRAN ODT) 4 mg disintegrating tablet Take 1 Tab by mouth every eight (8) hours as needed for Nausea. 30 Tab 0    methocarbamol (ROBAXIN) 500 mg tablet Take  by mouth four (4) times daily.  conjugated estrogens (PREMARIN) 0.9 mg tablet take 1 tablet by mouth once daily 90 Tab 3    lidocaine (LIDODERM) 5 % by TransDERmal route every twenty-four (24) hours. Apply patch to the affected area for 12 hours a day and remove for 12 hours a day.       tiZANidine (ZANAFLEX) 2 mg capsule take 2 capsule by oral route  every 6 hours         Past Medical History:   Diagnosis Date    Back problem     Constipation     Hypertension     Joint pain        Past Surgical History:   Procedure Laterality Date    HX HYSTERECTOMY Allergies   Allergen Reactions    Prednisone Other (comments)     muscle/joint pain       Patient Active Problem List   Diagnosis Code    DDD (degenerative disc disease), lumbar M51.36    DDD (degenerative disc disease), cervical M50.30    Essential hypertension I10    Migraine G43.909    Nausea R11.0    Epigastric abdominal pain R10.13    Other chronic pain G89.29    Family history of colon cancer Z80.0    Need for hepatitis C screening test Z11.59         Review of Systems:   Constitutional: no fever or chills  Skin denies rash or itching  HEENT: Positive visual changes. Denies tinnitus or hearing loss  Respiratory: denies shortness of breath  Cardiovascular: denies chest pain, dyspnea on exertion  Gastrointestinal: Positive nausea denies vomiting  Genitourinary: does not report dysuria or incontinence  Musculoskeletal: does not report joint pain or swelling  Endocrine: denies weight change  Hematology: denies easy bruising or bleeding   Neurological: as above in HPI      PHYSICAL EXAMINATION:      VITAL SIGNS:    Visit Vitals  /80 (BP 1 Location: Left arm, BP Patient Position: Sitting)   Pulse 92   Temp 99.1 °F (37.3 °C)   Resp 18   Ht 5' 5.5\" (1.664 m)   Wt 83 kg (183 lb)   SpO2 99%   BMI 29.99 kg/m²       GENERAL: Well developed, well nourished, in no apparent distress. HEART: RR, no murmurs heard, no carotid bruits  LUNGS:                      CTAB  EXTREMITIES: No clubbing, cyanosis, or edema is identified. Pulses 2+ and symmetrical.  HEAD:   Normocephalic, atraumatic. NEUROLOGIC EXAMINATION    MENTAL STATUS: Awake, alert, and oriented x 4. Attention and STM are grossly normal. There is no aphasia. Fund of knowledge is adequate. Mood and affect are appropriate  CRANIAL NERVES: Visual fields are full to confrontation. No fundus anomalies observed. Pupils are reactive to light and accommodation. Extraocular movements are intact and there is no nystagmus.    Facial sensation is normal  Face is symmetrical.   Hearing is grossly intact. SCM/TPZ 5/5  Palate rises symmetrically. Tongue is in the midline. MOTOR:   Normal tone, bulk, and strength, 5/5 muscle strength throughout. No cogwheel rigidity or clonus present. CEREBELLAR: Finger to nose was normal.   No tremors or dysmetria    SENSORY:  Normal PP, vibration, propioception. Romberg negative    DTR's:   +2 throughout except 3+ patellas bilaterally. Toes downgoing     GAIT:   Normal steady able to tandem walk      Impression/Plan  Lien Esparza is a 47 y.o. female whose history and physical are consistent with migraine headaches. Patient presents for evaluation of worsening headaches. Long history of migraines previously associated with her menstrual cycle. Significant history of endometriosis and hysterectomy in 2013. Currently on hormone replacement therapy. Endorses triggers including heat and weather. Endorses fall and winter worsening headaches. New development of lightheadedness or visual disturbance. This is intermittent. Associated with headaches. She can have several headaches a week but then go several weeks without having a headache. Of mention she is currently under the care of neurosurgery for evaluation of back pain and is being seen by pain management receiving tramadol and epidural injections for back pain. Review of care everywhere tab including MRI of the cervical spine dated 11/6 consistent with multilevel disc disease producing various degrees of canal stenosis with ventral cord flattening at 4 consecutive levels. No moderate or high-grade cord distortion, and no abnormal cord signal. MRI of the lumbar spine dated 11/6 consistent with minimal L4-5 and L5-S1 disc bulging and spondylosis.   Minimal degenerative grade 1 L4-5 spondylolisthesis from bilateral mild facet joint osteoarthritis but no stenosis. No recent MRI of the thoracic spine on file. Denies recent head imaging.  Will move forward with MRI of the brain looking for underlying abnormality that would be contributing to worsening course of headaches with associated lightheaded and visual disturbance. Depending on clinical course consider doppler and EEG. She endorses history of improvement with headaches on Topamax although side effects included brain fogginess. We discussed alternative medication including sonogram and Trokendi. Due to access will initiate Zonegran conservatively at 25 mg nightly for a week then increase to 50 mg nightly. Typically has a better side effect profile than Topamax. Can utilize Maxalt instead of Imitrex for abortive headache therapy. This is not a daily medication low risk of SS. Discussed medications, indications, side effects, and dosing. Patient verbalized understanding. Continue to track headaches. Future consideration for sleep evaluation although patient would like to hold off on this at this time. Follow-up after testing is complete. Maintain follow-up with her neurosurgeon for back pain. All questions addressed and patient is agreeable with plan of care. Diagnoses and all orders for this visit:    1. Worsening headaches  -     MRI BRAIN WO CONT; Future    2. Visual disturbance  -     MRI BRAIN WO CONT; Future    3. Lightheadedness  -     MRI BRAIN WO CONT; Future    Other orders  -     zonisamide (ZONEGRAN) 25 mg capsule; Take one tablet nightly for a week then increase to two tabs nightly thereafter  -     rizatriptan (MAXALT) 5 mg tablet; One tab at headache onset. May repeat in 2 hours if needed. Max two tabs in 24 hours. I spent 60 minutes with the patient in face-to-face consultation, with 45 minutes spent in counseling and coordination of care as described above. Signed By: Hue Childress NP    This note will not be viewable in [address] PLEASE NOTE:   Portions of this document may have been produced using voice recognition software.  Unrecognized errors in transcription may be present.

## 2019-11-15 NOTE — PROGRESS NOTES
Chief Complaint   Patient presents with    Migraine     Hx of migraines    New Patient       Dilcia Bhat presents today for   Chief Complaint   Patient presents with    Migraine     Hx of migraines    New Patient       Is someone accompanying this pt? no    Is the patient using any DME equipment during OV? no    Depression Screening:  3 most recent PHQ Screens 11/15/2019   Little interest or pleasure in doing things Not at all   Feeling down, depressed, irritable, or hopeless Not at all   Total Score PHQ 2 0       Learning Assessment:  Learning Assessment 8/5/2019   PRIMARY LEARNER Patient   HIGHEST LEVEL OF EDUCATION - PRIMARY LEARNER  4 YEARS OF COLLEGE   BARRIERS PRIMARY LEARNER NONE   CO-LEARNER CAREGIVER -   PRIMARY LANGUAGE ENGLISH   LEARNER PREFERENCE PRIMARY DEMONSTRATION     LISTENING     READING   ANSWERED BY self   RELATIONSHIP SELF       Abuse Screening:  Abuse Screening Questionnaire 11/3/2017   Do you ever feel afraid of your partner? N   Are you in a relationship with someone who physically or mentally threatens you? N   Is it safe for you to go home? Y       Fall Risk  Fall Risk Assessment, last 12 mths 11/3/2017   Able to walk? Yes   Fall in past 12 months? No         Coordination of Care:  1. Have you been to the ER, urgent care clinic since your last visit? Hospitalized since your last visit? no    2. Have you seen or consulted any other health care providers outside of the [address] [place] [address] since your last visit? Include any pap smears or colon screening. Yes, . Maria Luisa Esquivel, Dr Samantha Couch.

## 2019-11-26 ENCOUNTER — HOSPITAL ENCOUNTER (OUTPATIENT)
Age: 54
Discharge: HOME | End: 2019-11-26
Attending: NURSE PRACTITIONER
Payer: OTHER GOVERNMENT

## 2019-11-26 DIAGNOSIS — R51.9 WORSENING HEADACHES: ICD-10-CM

## 2019-11-26 DIAGNOSIS — H53.9 VISUAL DISTURBANCE: ICD-10-CM

## 2019-11-26 DIAGNOSIS — R42 LIGHTHEADEDNESS: ICD-10-CM

## 2019-11-26 PROCEDURE — 70551 MRI BRAIN STEM W/O DYE: CPT

## 2019-12-02 ENCOUNTER — PATIENT OUTREACH (OUTPATIENT)
Dept: FAMILY MEDICINE CLINIC | Age: 54
End: 2019-12-02

## 2019-12-10 ENCOUNTER — HOSPITAL ENCOUNTER (OUTPATIENT)
Dept: LAB | Age: 54
Discharge: HOME | End: 2019-12-10
Payer: OTHER GOVERNMENT

## 2019-12-10 DIAGNOSIS — R89.8 ABNORMAL BONE MARROW EXAMINATION: ICD-10-CM

## 2019-12-10 PROCEDURE — 82306 VITAMIN D 25 HYDROXY: CPT

## 2019-12-10 PROCEDURE — 36415 COLL VENOUS BLD VENIPUNCTURE: CPT

## 2019-12-11 LAB — 25(OH)D3 SERPL-MCNC: 27.2 NG/ML (ref 30–100)

## 2019-12-17 ENCOUNTER — HOSPITAL ENCOUNTER (OUTPATIENT)
Dept: LAB | Age: 54
Discharge: HOME | End: 2019-12-17
Payer: OTHER GOVERNMENT

## 2019-12-17 ENCOUNTER — OFFICE VISIT (OUTPATIENT)
Dept: NEUROLOGY | Age: 54
End: 2019-12-17

## 2019-12-17 VITALS
HEART RATE: 75 BPM | HEIGHT: 66 IN | DIASTOLIC BLOOD PRESSURE: 70 MMHG | OXYGEN SATURATION: 99 % | TEMPERATURE: 98 F | BODY MASS INDEX: 28.93 KG/M2 | SYSTOLIC BLOOD PRESSURE: 122 MMHG | RESPIRATION RATE: 18 BRPM | WEIGHT: 180 LBS

## 2019-12-17 DIAGNOSIS — R51.9 WORSENING HEADACHES: ICD-10-CM

## 2019-12-17 DIAGNOSIS — G43.009 MIGRAINE WITHOUT AURA AND WITHOUT STATUS MIGRAINOSUS, NOT INTRACTABLE: Primary | ICD-10-CM

## 2019-12-17 DIAGNOSIS — R41.3 MEMORY LOSS: ICD-10-CM

## 2019-12-17 LAB — VIT B12 SERPL-MCNC: 488 PG/ML (ref 211–911)

## 2019-12-17 PROCEDURE — 82607 VITAMIN B-12: CPT

## 2019-12-17 PROCEDURE — 83921 ORGANIC ACID SINGLE QUANT: CPT

## 2019-12-17 PROCEDURE — 36415 COLL VENOUS BLD VENIPUNCTURE: CPT

## 2019-12-17 RX ORDER — ZONISAMIDE 50 MG/1
CAPSULE ORAL
Qty: 90 CAP | Refills: 2 | Status: SHIPPED | OUTPATIENT
Start: 2019-12-17 | End: 2020-07-31 | Stop reason: SDUPTHER

## 2019-12-17 RX ORDER — RIZATRIPTAN BENZOATE 10 MG/1
TABLET ORAL
Qty: 9 TAB | Refills: 5 | Status: SHIPPED | OUTPATIENT
Start: 2019-12-17 | End: 2020-07-24 | Stop reason: SDUPTHER

## 2019-12-17 NOTE — PATIENT INSTRUCTIONS
Thank you for choosing [facility] and [facility] for your  
 
care. You may receive a survey about your visit. We appreciate you taking time  
 
to complete this survey as we use your feedback to improve our services. We  
 
realize we are not perfect, but we strive to provide excellent care.

## 2019-12-17 NOTE — PROGRESS NOTES
[facility]  [address], [address], [place], [address]  [address] [place], [address]  Office:  [phone]  Fax: [phone]  Chief Complaint   Patient presents with    Migraine     follow up      This is a 55-year-old female who presents for follow-up headaches. Headaches have improved on Zonegran. Denies focal deficits. Using Maxalt for abortive headache therapy with improvement. Here for results. Brings up some vague memory concerns feel this is related to history of hysterectomy back in 2013. Denies dangerous behaviors. Past Medical History:   Diagnosis Date    Back problem     Constipation     Hypertension     Joint pain        Past Surgical History:   Procedure Laterality Date    HX HYSTERECTOMY         Current Outpatient Medications   Medication Sig Dispense Refill    rizatriptan (MAXALT) 10 mg tablet One tab at headache onset. May repeat in 2 hours if needed. Max two tabs in 24 hours. 9 Tab 5    zonisamide (ZONEGRAN) 50 mg capsule Take 50 mg qhs 90 Cap 2    traMADol (ULTRAM) 50 mg tablet Take 1 Tab by mouth.  atenolol (TENORMIN) 25 mg tablet take 1 tablet by mouth once daily 90 Tab 5    pantoprazole (PROTONIX) 40 mg tablet Take 1 Tab by mouth two (2) times a day. 180 Tab 5    ondansetron (ZOFRAN ODT) 4 mg disintegrating tablet Take 1 Tab by mouth every eight (8) hours as needed for Nausea. 30 Tab 0    methocarbamol (ROBAXIN) 500 mg tablet Take  by mouth four (4) times daily.  conjugated estrogens (PREMARIN) 0.9 mg tablet take 1 tablet by mouth once daily 90 Tab 3    lidocaine (LIDODERM) 5 % by TransDERmal route every twenty-four (24) hours. Apply patch to the affected area for 12 hours a day and remove for 12 hours a day.       tiZANidine (ZANAFLEX) 2 mg capsule take 2 capsule by oral route  every 6 hours          Allergies   Allergen Reactions    Prednisone Other (comments)     muscle/joint pain       Social History     Tobacco Use    Smoking status: Former Smoker    Smokeless tobacco: Never Used   Substance Use Topics    Alcohol use: Yes     Alcohol/week: 1.0 standard drinks     Types: 1 Cans of beer per week    Drug use: No       Family History   Problem Relation Age of Onset    Hypertension Mother     Cancer Father         Prostate    Diabetes Father     Hypertension Father     Breast Cancer Maternal Aunt     Breast Cancer Paternal Aunt        Review of Systems  GENERAL: Denies fever or fatigue  CARDIAC: No CP or SOB  PULMONARY: No cough of SOB  MUSCULOSKELETAL: No new joint pain  NEURO: SEE HPI    Examination  Visit Vitals  /70 (BP 1 Location: Left arm, BP Patient Position: Sitting)   Pulse 75   Temp 98 °F (36.7 °C)   Resp 18   Ht 5' 5.5\" (1.664 m)   Wt 81.6 kg (180 lb)   SpO2 99%   BMI 29.50 kg/m²       This is a very pleasant 19-year-old female. She is alert and in no apparent distress. Full fund of knowledge. Speech is clear. No abnormal movements. No signs of incoordination or ataxia. Freely moves the upper and lower extremities. Steady gait. Result Information     Status: Final result (Exam End: 11/26/2019 16:23) Provider Status: Reviewed   Study Result     EXAM: MRI BRAIN WITHOUT CONTRAST  CPT code: 50723     CLINICAL INDICATION/HISTORY: Migraine headaches, increasing in frequency with  bilateral vision blurriness and dizziness.     COMPARISON: None.     TECHNIQUE: MRI of the brain was performed by using standard protocol pulse  sequences. Axial SWAN images are obtained for remote trauma. Additional  coronal FSPGR images are obtained to evaluate the temporal lobes.     FINDINGS:      There is no restricted diffusion. There is no significant abnormal increased  signal intensity on long-TR images and no focal defects are seen. There is no  mass, mass effect or hemorrhage. Cerebrospinal fluid spaces are normal for age. No significant extra-axial abnormalities are seen.   The midline structures are  unremarkable. Flow voids are present in the major vessels at the base of the  brain. Mastoids and visualized paranasal sinuses are clear. The orbits look  grossly normal.  The extracranial soft tissues are unremarkable.     IMPRESSION  IMPRESSION:      The brain looks normal.         Impression/Plan  Ana Sanders is a 47 y.o. female whose history and physical are consistent with worsening headaches, likely migrainous. Improvement on Zonegran 50 mg nightly and Maxalt. Refills provided. Discussed diagnostic test results including MRI of the brain unremarkable. Vague memory complaints. Denies dangerous behaviors. Will obtain B12. Call with results. Denies focal deficits. Overall doing quite well. As long as headaches remain controlled follow up in 6 months or sooner as needed. All questions addressed and patient is agreeable with plan of care. Diagnoses and all orders for this visit:    1. Migraine without aura and without status migrainosus, not intractable    2. Memory loss  -     VITAMIN B12; Future  -     METHYLMALONIC ACID; Future    3. Worsening headaches    Other orders  -     rizatriptan (MAXALT) 10 mg tablet; One tab at headache onset. May repeat in 2 hours if needed. Max two tabs in 24 hours. -     zonisamide (ZONEGRAN) 50 mg capsule; Take 50 mg qhs      Total time: 30 min   Counseling / coordination time: 25 min   > 50% counseling / coordination?: Yes re: symptoms, management, diagnostic test results, medication, answering questions, and plan of care. Signed By: Kathy Jennings NP    This note will not be viewable in [address] PLEASE NOTE:   Portions of this document may have been produced using voice recognition software. Unrecognized errors in transcription may be present.

## 2019-12-17 NOTE — PROGRESS NOTES
Chief Complaint   Patient presents with    Migraine     follow up     Deion Daniels presents today for   Chief Complaint   Patient presents with    Migraine     follow up       Is someone accompanying this pt? no    Is the patient using any DME equipment during [address]? no    Depression Screening:  3 most recent PHQ Screens 12/17/2019   Little interest or pleasure in doing things Not at all   Feeling down, depressed, irritable, or hopeless Not at all   Total Score PHQ 2 0       Learning Assessment:  Learning Assessment 8/5/2019   PRIMARY LEARNER Patient   HIGHEST LEVEL OF EDUCATION - PRIMARY LEARNER  4 YEARS OF COLLEGE   BARRIERS PRIMARY LEARNER NONE   CO-LEARNER CAREGIVER -   PRIMARY LANGUAGE ENGLISH   LEARNER PREFERENCE PRIMARY DEMONSTRATION     LISTENING     READING   ANSWERED BY self   RELATIONSHIP SELF       Abuse Screening:  Abuse Screening Questionnaire 11/3/2017   Do you ever feel afraid of your partner? N   Are you in a relationship with someone who physically or mentally threatens you? N   Is it safe for you to go home? Y       Fall Risk  Fall Risk Assessment, last 12 mths 11/3/2017   Able to walk? Yes   Fall in past 12 months? No         Coordination of Care:  1. Have you been to the ER, urgent care clinic since your last visit? Hospitalized since your last visit? no    2. Have you seen or consulted any other health care providers outside of the [address] [place] [address] since your last visit? Include any pap smears or colon screening. Yes, Maximilian, neurosurgon, Dr. Natalya Rose, pain management.

## 2019-12-20 LAB
Lab: NORMAL
METHYLMALONATE SERPL-SCNC: 213 NMOL/L (ref 0–378)

## 2019-12-31 ENCOUNTER — TELEPHONE (OUTPATIENT)
Dept: NEUROLOGY | Age: 54
End: 2019-12-31

## 2019-12-31 NOTE — TELEPHONE ENCOUNTER
----- Message from Britany Warner NP sent at 12/31/2019  9:48 AM EST -----  Regarding: B12  Please call patient and tell her her B12 level is normal.      Vitamin = B12 488    Normal rang=  211 - 911 pg/mL Final     Thank you.

## 2020-01-10 ENCOUNTER — HOSPITAL ENCOUNTER (OUTPATIENT)
Dept: BONE DENSITY | Age: 55
Discharge: HOME | End: 2020-01-10
Attending: FAMILY MEDICINE
Payer: OTHER GOVERNMENT

## 2020-01-10 DIAGNOSIS — N95.9 MENOPAUSAL AND PERIMENOPAUSAL DISORDER: ICD-10-CM

## 2020-01-10 PROCEDURE — 77080 DXA BONE DENSITY AXIAL: CPT

## 2020-01-13 NOTE — PROGRESS NOTES
I have attempted without success to contact this patient by phone to discuss lab results and I left a message on answering machine. Letter was sent.

## 2020-01-30 ENCOUNTER — PATIENT OUTREACH (OUTPATIENT)
Dept: FAMILY MEDICINE CLINIC | Age: 55
End: 2020-01-30

## 2020-02-03 ENCOUNTER — PATIENT OUTREACH (OUTPATIENT)
Dept: FAMILY MEDICINE CLINIC | Age: 55
End: 2020-02-03

## 2020-03-10 ENCOUNTER — PATIENT OUTREACH (OUTPATIENT)
Dept: FAMILY MEDICINE CLINIC | Age: 55
End: 2020-03-10

## 2020-06-15 ENCOUNTER — PATIENT OUTREACH (OUTPATIENT)
Dept: FAMILY MEDICINE CLINIC | Age: 55
End: 2020-06-15

## 2020-06-15 NOTE — PROGRESS NOTES
[facility] health screening:    I've reminded pt of Dr. Leblanc Po number and encouraged she call office for further questions or assistance as I will no longer f/u due to expiration of [address]. Closed episode.

## 2020-07-24 RX ORDER — RIZATRIPTAN BENZOATE 10 MG/1
TABLET ORAL
Qty: 9 TAB | Refills: 0 | Status: SHIPPED | OUTPATIENT
Start: 2020-07-24 | End: 2020-07-31 | Stop reason: ALTCHOICE

## 2020-07-24 NOTE — TELEPHONE ENCOUNTER
Requested Prescriptions     Pending Prescriptions Disp Refills    rizatriptan (MAXALT) 10 mg tablet 9 Tab 5     Sig: One tab at headache onset. May repeat in 2 hours if needed. Max two tabs in 24 hours. Pt requests temporary refill until she can schedule another follow-up appt. Pt's [employer] insurance auth  2020 and is requesting an extension.

## 2020-07-31 ENCOUNTER — VIRTUAL VISIT (OUTPATIENT)
Dept: NEUROLOGY | Age: 55
End: 2020-07-31

## 2020-07-31 DIAGNOSIS — G43.009 MIGRAINE WITHOUT AURA AND WITHOUT STATUS MIGRAINOSUS, NOT INTRACTABLE: Primary | ICD-10-CM

## 2020-07-31 RX ORDER — RIZATRIPTAN BENZOATE 10 MG/1
TABLET, ORALLY DISINTEGRATING ORAL
Qty: 12 TAB | Refills: 0 | Status: SHIPPED | OUTPATIENT
Start: 2020-07-31 | End: 2020-08-03 | Stop reason: ALTCHOICE

## 2020-07-31 RX ORDER — ZONISAMIDE 25 MG/1
CAPSULE ORAL
Qty: 90 CAP | Refills: 1 | Status: SHIPPED | OUTPATIENT
Start: 2020-07-31 | End: 2022-01-31

## 2020-07-31 RX ORDER — RIZATRIPTAN BENZOATE 10 MG/1
TABLET, ORALLY DISINTEGRATING ORAL
Qty: 36 TAB | Refills: 3 | Status: SHIPPED | OUTPATIENT
Start: 2020-07-31 | End: 2020-08-03 | Stop reason: SDUPTHER

## 2020-07-31 RX ORDER — ZONISAMIDE 50 MG/1
CAPSULE ORAL
Qty: 90 CAP | Refills: 3 | Status: SHIPPED | OUTPATIENT
Start: 2020-07-31 | End: 2022-01-31

## 2020-07-31 NOTE — PROGRESS NOTES
Ghislaine Diaz is a 54 y.o. female on virtual visit for follow-up on migraines. 1. Have you been to the ER, urgent care clinic since your last visit? Hospitalized since your last visit? No    2. Have you seen or consulted any other health care providers outside of the [address] [place] [address] since your last visit? Include any pap smears or colon screening. No    Mobile number for today's visit will be [phone].

## 2020-07-31 NOTE — PROGRESS NOTES
Barbara Harris is a 54 y.o. female who was seen by synchronous (real-time) audio-video technology on 7/31/2020 for Migraine and Results      Assessment & Plan:   Diagnoses and all orders for this visit:    1. Migraine without aura and without status migrainosus, not intractable    Other orders  -     rizatriptan (MAXALT-MLT) 10 mg disintegrating tablet; One tablet sublingual at headache onset. May repeat in 2 hours if needed. Max two tabs in 24 hours  -     rizatriptan (MAXALT-MLT) 10 mg disintegrating tablet; One tab sublingual at headache onset. May repeat in 2 hours if needed. Max two tabs in 24 hours. -     zonisamide (ZONEGRAN) 50 mg capsule; Take 50 mg qhs  -     zonisamide (ZONEGRAN) 25 mg capsule; Take along with 50 mg tablet for total of 75 mg qhs    Patient doing well on current treatment plan. Having about 3 migraine headache days a month. Denies focal deficits. Can increase Zonegran to 75 mg qhs to try for better migraine control. Uses Maxalt as needed. Nausea at onset of headache so we will switch to Maxalt melt. Discussed medication, indication, side effects, and dosing. Patient verbalized understanding. Refills sent to local pharmacy due to limited supply[facility]. Continue to track headaches. Follow up in 3 months. Patient would like to do another virtual visit. All questions addressed and patient is agreeable with plan of care. I spent at least 25 minutes on this visit with this established patient. 712  Subjective: Follow up headaches. Last seen in office in December. In the interim endorses switched back to premarin pill from estrogen cream. Thinks this has helped some with migraines. Having three migraine headache days a month. Having nausea before head pain starts. Previously would have visual aura prior. No longer having this. She denies focal deficits. She will take Maxalt and this will calm migraines down. May have to repeat dose.  Maintained on Zonegran 50 mg nightly and she is tolerating this well. Happy with no side effects such as drowsiness. No other concerns at this time. Prior to Admission medications    Medication Sig Start Date End Date Taking? Authorizing Provider   rizatriptan (MAXALT-MLT) 10 mg disintegrating tablet One tablet sublingual at headache onset. May repeat in 2 hours if needed. Max two tabs in 24 hours 7/31/20  Yes Chase Back NP   rizatriptan (MAXALT-MLT) 10 mg disintegrating tablet One tab sublingual at headache onset. May repeat in 2 hours if needed. Max two tabs in 24 hours. 7/31/20  Yes Chase Back NP   zonisamide (ZONEGRAN) 50 mg capsule Take 50 mg qhs 7/31/20  Yes Chase Back NP   zonisamide (ZONEGRAN) 25 mg capsule Take along with 50 mg tablet for total of 75 mg qhs 7/31/20  Yes Chase Back NP   traMADol (ULTRAM) 50 mg tablet Take 1 Tab by mouth. Yes Provider, Historical   atenolol (TENORMIN) 25 mg tablet take 1 tablet by mouth once daily 8/5/19  Yes Javier Beard NP   pantoprazole (PROTONIX) 40 mg tablet Take 1 Tab by mouth two (2) times a day. 8/5/19  Yes Javier Beard NP   ondansetron (ZOFRAN ODT) 4 mg disintegrating tablet Take 1 Tab by mouth every eight (8) hours as needed for Nausea. 8/5/19  Yes Leann Bearden NP   methocarbamol (ROBAXIN) 500 mg tablet Take  by mouth four (4) times daily. Yes Provider, Historical   conjugated estrogens (PREMARIN) 0.9 mg tablet take 1 tablet by mouth once daily 9/24/18  Yes Fern Dong MD   rizatriptan (MAXALT) 10 mg tablet One tab at headache onset. May repeat in 2 hours if needed. Max two tabs in 24 hours.  7/24/20 7/31/20  Chase Back NP   zonisamide (ZONEGRAN) 50 mg capsule Take 50 mg qhs 12/17/19 7/31/20  Bibeault, Stone Red, NP   tiZANidine (ZANAFLEX) 2 mg capsule take 2 capsule by oral route  every 6 hours    Provider, Historical   lidocaine (LIDODERM) 5 % by TransDERmal route every twenty-four (24) hours. Apply patch to the affected area for 12 hours a day and remove for 12 hours a day. Provider, Historical     Patient Active Problem List   Diagnosis Code    DDD (degenerative disc disease), lumbar M51.36    DDD (degenerative disc disease), cervical M50.30    Essential hypertension I10    Migraine G43.909    Nausea R11.0    Epigastric abdominal pain R10.13    Other chronic pain G89.29    Family history of colon cancer Z80.0    Need for hepatitis C screening test Z11.59     Patient Active Problem List    Diagnosis Date Noted    Nausea 08/05/2019    Epigastric abdominal pain 08/05/2019    Other chronic pain 08/05/2019    Family history of colon cancer 08/05/2019    Need for hepatitis C screening test 08/05/2019    DDD (degenerative disc disease), lumbar 11/03/2017    DDD (degenerative disc disease), cervical 11/03/2017    Essential hypertension 11/03/2017    Migraine 11/03/2017     Current Outpatient Medications   Medication Sig Dispense Refill    rizatriptan (MAXALT-MLT) 10 mg disintegrating tablet One tablet sublingual at headache onset. May repeat in 2 hours if needed. Max two tabs in 24 hours 12 Tab 0    rizatriptan (MAXALT-MLT) 10 mg disintegrating tablet One tab sublingual at headache onset. May repeat in 2 hours if needed. Max two tabs in 24 hours. 36 Tab 3    zonisamide (ZONEGRAN) 50 mg capsule Take 50 mg qhs 90 Cap 3    zonisamide (ZONEGRAN) 25 mg capsule Take along with 50 mg tablet for total of 75 mg qhs 90 Cap 1    traMADol (ULTRAM) 50 mg tablet Take 1 Tab by mouth.  atenolol (TENORMIN) 25 mg tablet take 1 tablet by mouth once daily 90 Tab 5    pantoprazole (PROTONIX) 40 mg tablet Take 1 Tab by mouth two (2) times a day. 180 Tab 5    ondansetron (ZOFRAN ODT) 4 mg disintegrating tablet Take 1 Tab by mouth every eight (8) hours as needed for Nausea. 30 Tab 0    methocarbamol (ROBAXIN) 500 mg tablet Take  by mouth four (4) times daily.       conjugated estrogens (PREMARIN) 0.9 mg tablet take 1 tablet by mouth once daily 90 Tab 3    tiZANidine (ZANAFLEX) 2 mg capsule take 2 capsule by oral route  every 6 hours      lidocaine (LIDODERM) 5 % by TransDERmal route every twenty-four (24) hours. Apply patch to the affected area for 12 hours a day and remove for 12 hours a day. Allergies   Allergen Reactions    Prednisone Other (comments)     muscle/joint pain     Past Medical History:   Diagnosis Date    Back problem     Constipation     Hypertension     Joint pain      Past Surgical History:   Procedure Laterality Date    HX HYSTERECTOMY       Family History   Problem Relation Age of Onset    Hypertension Mother     Cancer Father         Prostate    Diabetes Father     Hypertension Father     Breast Cancer Maternal Aunt     Breast Cancer Paternal Aunt      Social History     Tobacco Use    Smoking status: Former Smoker    Smokeless tobacco: Never Used   Substance Use Topics    Alcohol use: Yes     Alcohol/week: 1.0 standard drinks     Types: 1 Cans of beer per week       Review of Systems  GENERAL: Denies fever or fatigue  CARDIAC: No CP or SOB  PULMONARY: No cough of SOB  MUSCULOSKELETAL: No new joint pain  NEURO: SEE HPI    Objective:     Patient-Reported Vitals 7/31/2020   Patient-Reported Weight 179lb   Patient-Reported Height 5'5.5      General: alert, cooperative, no distress   Mental  status: normal mood, behavior, speech, dress, motor activity, and thought processes, able to follow commands   HENT: NCAT   Neck: no visualized mass   Resp: no respiratory distress   Neuro: no gross deficits   Skin: no discoloration or lesions of concern on visible areas   Psychiatric: normal affect, consistent with stated mood, no evidence of hallucinations     Additional exam findings: This is a very pleasant 54year old female. She is alert and in NAD. Full fund of knowledge. Speech is clear. No facial asymmetry. No signs of ataxia or incoordination.      We discussed the expected course, resolution and complications of the diagnosis(es) in detail. Medication risks, benefits, costs, interactions, and alternatives were discussed as indicated. I advised her to contact the office if her condition worsens, changes or fails to improve as anticipated. She expressed understanding with the diagnosis(es) and plan. Brien Curran, who was evaluated through a patient-initiated, synchronous (real-time) audio-video encounter, and/or her healthcare decision maker, is aware that it is a billable service, with coverage as determined by her insurance carrier. She provided verbal consent to proceed: Yes, and patient identification was verified. It was conducted pursuant to the emergency declaration under the [address] [place] [address] authority and the [employer]. A caregiver was present when appropriate. Ability to conduct physical exam was limited. I was at home. The patient was at home. Kathy Rose NP  This note will not be viewable in [address]

## 2020-08-03 ENCOUNTER — TELEPHONE (OUTPATIENT)
Dept: NEUROLOGY | Age: 55
End: 2020-08-03

## 2020-08-03 RX ORDER — RIZATRIPTAN BENZOATE 10 MG/1
TABLET, ORALLY DISINTEGRATING ORAL
Qty: 36 TAB | Refills: 3 | Status: SHIPPED | OUTPATIENT
Start: 2020-08-03 | End: 2022-01-31

## 2020-08-03 NOTE — TELEPHONE ENCOUNTER
Pharmacy is seeking clarification for dosing medication Rizatriptan ODT. Your directions are \"under the tongue\", pharmacy is recommending a change in directions to read \"place on tongue\". Please advise with new rx to [facility].

## 2020-10-14 DIAGNOSIS — R10.13 EPIGASTRIC ABDOMINAL PAIN: ICD-10-CM

## 2020-10-14 DIAGNOSIS — R11.0 NAUSEA: ICD-10-CM

## 2020-10-14 RX ORDER — PANTOPRAZOLE SODIUM 40 MG/1
TABLET, DELAYED RELEASE ORAL
Qty: 180 TAB | Refills: 3 | OUTPATIENT
Start: 2020-10-14

## 2020-10-14 NOTE — TELEPHONE ENCOUNTER
Patient needs an appointment. Dr. Luis Velasquez is her PCP but unsure if she will have an appointment if patient needs this medication soon.   Ben Dumont

## 2020-10-28 DIAGNOSIS — I10 ESSENTIAL HYPERTENSION: ICD-10-CM

## 2020-10-28 RX ORDER — ATENOLOL 25 MG/1
TABLET ORAL
Qty: 90 TAB | Refills: 3 | OUTPATIENT
Start: 2020-10-28

## 2020-11-04 RX ORDER — ATENOLOL 25 MG/1
TABLET ORAL
Qty: 90 TAB | Refills: 0 | Status: SHIPPED | OUTPATIENT
Start: 2020-11-04 | End: 2020-11-20 | Stop reason: SDUPTHER

## 2020-11-20 ENCOUNTER — VIRTUAL VISIT (OUTPATIENT)
Dept: FAMILY MEDICINE CLINIC | Age: 55
End: 2020-11-20
Payer: OTHER GOVERNMENT

## 2020-11-20 DIAGNOSIS — R11.0 NAUSEA: ICD-10-CM

## 2020-11-20 DIAGNOSIS — R10.11 RIGHT UPPER QUADRANT ABDOMINAL PAIN: ICD-10-CM

## 2020-11-20 DIAGNOSIS — K21.9 GASTROESOPHAGEAL REFLUX DISEASE, UNSPECIFIED WHETHER ESOPHAGITIS PRESENT: ICD-10-CM

## 2020-11-20 DIAGNOSIS — G43.809 OTHER MIGRAINE WITHOUT STATUS MIGRAINOSUS, NOT INTRACTABLE: Primary | ICD-10-CM

## 2020-11-20 DIAGNOSIS — I10 ESSENTIAL HYPERTENSION: ICD-10-CM

## 2020-11-20 PROBLEM — Z11.59 NEED FOR HEPATITIS C SCREENING TEST: Status: RESOLVED | Noted: 2019-08-05 | Resolved: 2020-11-20

## 2020-11-20 PROCEDURE — 99214 OFFICE O/P EST MOD 30 MIN: CPT | Performed by: FAMILY MEDICINE

## 2020-11-20 RX ORDER — ATENOLOL 25 MG/1
TABLET ORAL
Qty: 90 TAB | Refills: 1 | Status: SHIPPED | OUTPATIENT
Start: 2020-11-20 | End: 2021-05-24 | Stop reason: SDUPTHER

## 2020-11-20 RX ORDER — ONDANSETRON 4 MG/1
4 TABLET, ORALLY DISINTEGRATING ORAL
Qty: 30 TAB | Refills: 2 | Status: SHIPPED | OUTPATIENT
Start: 2020-11-20 | End: 2021-06-09

## 2020-11-20 NOTE — PROGRESS NOTES
Rosa Parisi is a 54 y.o. female who was seen by synchronous (real-time) audio-video technology on 11/20/2020 for Medication Problem (Pt having problem with filling a medication,) and Abdominal Pain (abd RUQ,and back, when eating steak or alcohol (red wine), seems to onset after midnight lasting hours then stopping after several hour. Last episode about 20 days ago.)        Assessment & Plan:   Diagnoses and all orders for this visit:    1. Other migraine without status migrainosus, not intractable  -     ondansetron (ZOFRAN ODT) 4 mg disintegrating tablet; Take 1 Tab by mouth every eight (8) hours as needed for Nausea. -     REFERRAL TO NEUROLOGY    2. Essential hypertension  -     METABOLIC PANEL, COMPREHENSIVE; Future  -     LIPID PANEL; Future  -     atenoloL (TENORMIN) 25 mg tablet; take 1 tablet by mouth once daily  Stable, cont pres tx plan. 3. Nausea  -     ondansetron (ZOFRAN ODT) 4 mg disintegrating tablet; Take 1 Tab by mouth every eight (8) hours as needed for Nausea. 4. Right upper quadrant abdominal pain  -     METABOLIC PANEL, COMPREHENSIVE; Future  -     US ABD LTD; Future  -     CBC WITH AUTOMATED DIFF; Future  -     CRP, HIGH SENSITIVITY; Future  -     AMYLASE; Future  -     LIPASE; Future  -     GGT; Future  Will f/u as indicated. 5. Gastroesophageal reflux disease, unspecified whether esophagitis present  Stable, cont pres tx plan. The complexity of medical decision making for this visit is moderate   Follow-up and Dispositions    · Return in about 3 months (around 2/20/2021), or if symptoms worsen or fail to improve, for high blood pressure, gerd. 712  Subjective:   Patient contacted via doxy. me. Pt reports that she has been doing well overall but began to have some abd pain in the last few months. The pain is intermittent. She will awaken with severe back pain that migrates to her front, just under the rib cage. The pain will last from 4-6 hours.   It has resolved just before she would go to the Er. She has increased sx with steak, red meat, alcohol. Pt does not have any heartburn. She does get nauseous. Pt did go to an urgent care after one episode. She had a ua that was nl and was advised to go to the ER when sx occurred again. Pt's home bp is normotensive. Pt will need a refill on her atenolol. Prior to Admission medications    Medication Sig Start Date End Date Taking? Authorizing Provider   atenoloL (TENORMIN) 25 mg tablet take 1 tablet by mouth once daily 11/20/20  Yes Surjit Dong MD   ondansetron (ZOFRAN ODT) 4 mg disintegrating tablet Take 1 Tab by mouth every eight (8) hours as needed for Nausea. 11/20/20  Yes Esther Meadows MD   rizatriptan (MAXALT-MLT) 10 mg disintegrating tablet One tab placed on tongue at headache onset. May repeat in 2 hours if needed. Max two tabs in 24 hours. 8/3/20  Yes Igor Back NP   zonisamide (ZONEGRAN) 50 mg capsule Take 50 mg qhs 7/31/20  Yes Igor Back NP   zonisamide (ZONEGRAN) 25 mg capsule Take along with 50 mg tablet for total of 75 mg qhs 7/31/20  Yes Igor Back NP   traMADol (ULTRAM) 50 mg tablet Take 1 Tab by mouth. Yes Provider, Historical   tiZANidine (ZANAFLEX) 2 mg capsule take 2 capsule by oral route  every 6 hours   Yes Provider, Historical   pantoprazole (PROTONIX) 40 mg tablet Take 1 Tab by mouth two (2) times a day. 8/5/19  Yes Leann Bearden NP   methocarbamol (ROBAXIN) 500 mg tablet Take  by mouth four (4) times daily. Yes Provider, Historical   conjugated estrogens (PREMARIN) 0.9 mg tablet take 1 tablet by mouth once daily 9/24/18  Yes Surjit Dong MD   lidocaine (LIDODERM) 5 % by TransDERmal route every twenty-four (24) hours. Apply patch to the affected area for 12 hours a day and remove for 12 hours a day.    Yes Provider, Historical     Patient Active Problem List   Diagnosis Code    DDD (degenerative disc disease), lumbar M51.36    DDD (degenerative disc disease), cervical M50.30    Essential hypertension I10    Migraine G43.909    Nausea R11.0    Epigastric abdominal pain R10.13    Other chronic pain G89.29    Family history of colon cancer Z80.0    GERD (gastroesophageal reflux disease) K21.9    Hypertension I10     Current Outpatient Medications   Medication Sig Dispense Refill    atenoloL (TENORMIN) 25 mg tablet take 1 tablet by mouth once daily 90 Tab 1    ondansetron (ZOFRAN ODT) 4 mg disintegrating tablet Take 1 Tab by mouth every eight (8) hours as needed for Nausea. 30 Tab 2    rizatriptan (MAXALT-MLT) 10 mg disintegrating tablet One tab placed on tongue at headache onset. May repeat in 2 hours if needed. Max two tabs in 24 hours. 36 Tab 3    zonisamide (ZONEGRAN) 50 mg capsule Take 50 mg qhs 90 Cap 3    zonisamide (ZONEGRAN) 25 mg capsule Take along with 50 mg tablet for total of 75 mg qhs 90 Cap 1    traMADol (ULTRAM) 50 mg tablet Take 1 Tab by mouth.  tiZANidine (ZANAFLEX) 2 mg capsule take 2 capsule by oral route  every 6 hours      pantoprazole (PROTONIX) 40 mg tablet Take 1 Tab by mouth two (2) times a day. 180 Tab 5    methocarbamol (ROBAXIN) 500 mg tablet Take  by mouth four (4) times daily.  conjugated estrogens (PREMARIN) 0.9 mg tablet take 1 tablet by mouth once daily 90 Tab 3    lidocaine (LIDODERM) 5 % by TransDERmal route every twenty-four (24) hours. Apply patch to the affected area for 12 hours a day and remove for 12 hours a day.        Allergies   Allergen Reactions    Prednisone Other (comments)     muscle/joint pain     Past Medical History:   Diagnosis Date    Back problem     Constipation     DDD (degenerative disc disease), cervical     DDD (degenerative disc disease), lumbar     GERD (gastroesophageal reflux disease)     Hypertension     Joint pain     Migraine 11/3/2017     Past Surgical History:   Procedure Laterality Date    HX HYSTERECTOMY       Family History Problem Relation Age of Onset    Hypertension Mother     Cancer Father         Prostate    Diabetes Father     Hypertension Father     Breast Cancer Maternal Aunt     Breast Cancer Paternal Aunt      Social History     Tobacco Use    Smoking status: Former Smoker     Years: 2.00     Types: Cigarettes    Smokeless tobacco: Never Used   Substance Use Topics    Alcohol use: Yes     Alcohol/week: 1.0 standard drinks     Types: 1 Cans of beer per week       Review of Systems   Constitutional: Negative. HENT: Negative. Respiratory: Negative. Cardiovascular: Negative. Gastrointestinal: Positive for abdominal pain. All other systems reviewed and are negative. Objective:     Patient-Reported Vitals 7/31/2020   Patient-Reported Weight 179lb   Patient-Reported Height 5'5.5      General: alert, cooperative, no distress   Mental  status: normal mood, behavior, speech, dress, motor activity, and thought processes, able to follow commands   HENT: NCAT   Neck: no visualized mass   Resp: no respiratory distress   Neuro: no gross deficits   Skin: no discoloration or lesions of concern on visible areas   Psychiatric: normal affect, consistent with stated mood, no evidence of hallucinations     Additional exam findings: RUQ abd pain      We discussed the expected course, resolution and complications of the diagnosis(es) in detail. Medication risks, benefits, costs, interactions, and alternatives were discussed as indicated. I advised her to contact the office if her condition worsens, changes or fails to improve as anticipated. She expressed understanding with the diagnosis(es) and plan. Nicolecarol Connolly, who was evaluated through a patient-initiated, synchronous (real-time) audio-video encounter, and/or her healthcare decision maker, is aware that it is a billable service, with coverage as determined by her insurance carrier.  She provided verbal consent to proceed: Yes, and patient identification was verified. It was conducted pursuant to the emergency declaration under the [facility], [address] [place] [address] and the [employer] General Act. A caregiver was present when appropriate. Ability to conduct physical exam was limited. I was in the office. The patient was at home.       Darryl Bird MD

## 2020-11-20 NOTE — PROGRESS NOTES
Jackie Weiss presents today for   Chief Complaint   Patient presents with    Medication Problem     Pt having problem with filling a medication,    Abdominal Pain     abd RUQ,and back, when eating steak or alcohol (red wine), seems to onset after midnight lasting hours then stopping after several hour. Last episode about 20 days ago. Is someone accompanying this pt? No    Is the patient using any DME equipment during OV? No    Depression Screening:  3 most recent PHQ Screens 11/20/2020   Little interest or pleasure in doing things Not at all   Feeling down, depressed, irritable, or hopeless Not at all   Total Score PHQ 2 0       Learning Assessment:  Learning Assessment 8/5/2019   PRIMARY LEARNER Patient   HIGHEST LEVEL OF EDUCATION - PRIMARY LEARNER  4 YEARS OF COLLEGE   BARRIERS PRIMARY LEARNER NONE   CO-LEARNER CAREGIVER -   PRIMARY LANGUAGE ENGLISH   LEARNER PREFERENCE PRIMARY DEMONSTRATION     LISTENING     READING   ANSWERED BY self   RELATIONSHIP SELF       Abuse Screening:  Abuse Screening Questionnaire 11/20/2020   Do you ever feel afraid of your partner? N   Are you in a relationship with someone who physically or mentally threatens you? N   Is it safe for you to go home? -         Health Maintenance Due   Topic Date Due    DTaP/Tdap/Td series (1 - Tdap) 03/18/1986    Shingrix Vaccine Age 50> (1 of 2) 03/18/2015    Colorectal Cancer Screening Combo  03/18/2015    Flu Vaccine (1) 09/01/2020   . Health Maintenance reviewed and discussed and ordered per Provider. Coordination of Care  1. Have you been to the ER, urgent care clinic since your last visit? Hospitalized since your last visit? No    2. Have you seen or consulted any other health care providers outside of the [address] [place] [address] since your last visit? Include any pap smears or colon screening. No      Advance Directive:  1. Do you have an advance directive in place? Patient Reply:No    2.  If not, would you like material regarding how to put one in place?  Patient Reply: No

## 2021-02-22 ENCOUNTER — VIRTUAL VISIT (OUTPATIENT)
Dept: FAMILY MEDICINE CLINIC | Age: 56
End: 2021-02-22

## 2021-02-22 DIAGNOSIS — M51.36 DDD (DEGENERATIVE DISC DISEASE), LUMBAR: ICD-10-CM

## 2021-02-22 DIAGNOSIS — I10 ESSENTIAL HYPERTENSION: Primary | ICD-10-CM

## 2021-02-22 DIAGNOSIS — K80.20 GALLSTONES: ICD-10-CM

## 2021-02-22 DIAGNOSIS — G89.29 OTHER CHRONIC PAIN: ICD-10-CM

## 2021-02-22 DIAGNOSIS — G89.29 CHRONIC THORACIC BACK PAIN, UNSPECIFIED BACK PAIN LATERALITY: ICD-10-CM

## 2021-02-22 DIAGNOSIS — M54.6 CHRONIC THORACIC BACK PAIN, UNSPECIFIED BACK PAIN LATERALITY: ICD-10-CM

## 2021-02-22 PROCEDURE — 99214 OFFICE O/P EST MOD 30 MIN: CPT | Performed by: FAMILY MEDICINE

## 2021-02-22 RX ORDER — ALPRAZOLAM 0.25 MG/1
TABLET ORAL
COMMUNITY
Start: 2020-12-11

## 2021-02-22 NOTE — PROGRESS NOTES
Jose Wei presents today for   Chief Complaint   Patient presents with    Hypertension    GERD    Other     Needs new referral for Pain management ( Dr. Cindy Rangel ) [facility]. Virtual/telephone visit    Depression Screening:  3 most recent PHQ Screens 2/22/2021   Little interest or pleasure in doing things Not at all   Feeling down, depressed, irritable, or hopeless Not at all   Total Score PHQ 2 0       Learning Assessment:  Learning Assessment 2/22/2021   PRIMARY LEARNER Patient   HIGHEST LEVEL OF EDUCATION - PRIMARY LEARNER  4 YEARS [facility] PRIMARY LEARNER NONE   CO-LEARNER CAREGIVER No   PRIMARY LANGUAGE ENGLISH   LEARNER PREFERENCE PRIMARY DEMONSTRATION     -     -   ANSWERED BY Patient   RELATIONSHIP SELF       Travel Screening:   Travel Screening     Question   Response    In the last month, have you been in contact with someone who was confirmed or suspected to have Coronavirus / COVID-19? No / Unsure    Have you had a COVID-19 viral test in the last 14 days? No    Do you have any of the following new or worsening symptoms? None of these    Have you traveled internationally in the last month? No      Travel History   Travel since 01/22/21     No documented travel since 01/22/21          Health Maintenance reviewed and discussed and ordered per Provider. Health Maintenance Due   Topic Date Due    DTaP/Tdap/Td series (1 - Tdap) 03/18/1986    Shingrix Vaccine Age 50> (1 of 2) 03/18/2015    Colorectal Cancer Screening Combo  03/18/2015    Flu Vaccine (1) 09/01/2020   . Coordination of Care:  1. Have you been to the ER, urgent care clinic since your last visit? Hospitalized since your last visit? [employer] 02/08/21, r/t gallbladder concerns    2. Have you seen or consulted any other health care providers outside of the [address] [place] [address] since your last visit? Include any pap smears or colon screening.  no

## 2021-02-22 NOTE — PROGRESS NOTES
Jo Ann Khan is a 55 y.o. female who was seen by synchronous (real-time) audio-video technology on 2/22/2021 for Hypertension, GERD, and Other (Needs new referral for Pain management ( Dr. Duran ) [facility]. )        Assessment & Plan:   Diagnoses and all orders for this visit:    1. Essential hypertension  Stable, cont pres tx plan.     2. Gallstones  -     REFERRAL TO SURGERY    3. DDD (degenerative disc disease), lumbar  -     REFERRAL TO PAIN MANAGEMENT    4. Other chronic pain  -     REFERRAL TO PAIN MANAGEMENT    5. Chronic thoracic back pain, unspecified back pain laterality  -     REFERRAL TO PAIN MANAGEMENT      The complexity of medical decision making for this visit is moderate   Follow-up and Dispositions    · Return in about 3 months (around 5/22/2021) for high blood pressure, gerd.           712  Subjective:   Patient contacted via doxy.me.      Since her last visit, pt has had an ER visit.  She was dx with gallstones.  She was advised to ask her pcp to refer her to gen surg.  All of her labs there were wnl.      Pt has not seen her neuro provider recently because she was on maternity leave.  Pt will call later this month to set up an appt.      Pt needs an updated referral to Dr. Duran.  She sees him for her chronic back pain.      Home bp readings are normotensive. It was elevated the day of the ER visit.   `    Pt wants to get a covid vaccination  Prior to Admission medications    Medication Sig Start Date End Date Taking? Authorizing Provider   ALPRAZolam (XANAX) 0.25 mg tablet take 1 to 2 tablets by mouth at bedtime for insomnia RELATED TO PAIN AND ANXIETY 12/11/20  Yes Provider, Historical   atenoloL (TENORMIN) 25 mg tablet take 1 tablet by mouth once daily 11/20/20  Yes Angela Dong MD   ondansetron (ZOFRAN ODT) 4 mg disintegrating tablet Take 1 Tab by mouth every eight (8) hours as needed for Nausea. 11/20/20  Yes Angela Dong MD   rizatriptan (MAXALT-MLT) 10 mg disintegrating tablet  One tab placed on tongue at headache onset. May repeat in 2 hours if needed. Max two tabs in 24 hours. 8/3/20  Yes Michelle Back NP   traMADol (ULTRAM) 50 mg tablet Take 1 Tab by mouth. Yes Provider, Historical   pantoprazole (PROTONIX) 40 mg tablet Take 1 Tab by mouth two (2) times a day. 8/5/19  Yes Leann Bearden NP   methocarbamol (ROBAXIN) 500 mg tablet Take  by mouth four (4) times daily. Yes Provider, Historical   conjugated estrogens (PREMARIN) 0.9 mg tablet take 1 tablet by mouth once daily 9/24/18  Yes Tam Dong MD   lidocaine (LIDODERM) 5 % by TransDERmal route every twenty-four (24) hours. Apply patch to the affected area for 12 hours a day and remove for 12 hours a day. Yes Provider, Historical   zonisamide (ZONEGRAN) 50 mg capsule Take 50 mg qhs 7/31/20   Michelle Back NP   zonisamide (ZONEGRAN) 25 mg capsule Take along with 50 mg tablet for total of 75 mg qhs 7/31/20   Michelle Back NP   tiZANidine (ZANAFLEX) 2 mg capsule take 2 capsule by oral route  every 6 hours    Provider, Historical     Patient Active Problem List   Diagnosis Code    DDD (degenerative disc disease), lumbar M51.36    DDD (degenerative disc disease), cervical M50.30    Essential hypertension I10    Migraine G43.909    Nausea R11.0    Epigastric abdominal pain R10.13    Other chronic pain G89.29    Family history of colon cancer Z80.0    GERD (gastroesophageal reflux disease) K21.9    Hypertension I10     Current Outpatient Medications   Medication Sig Dispense Refill    ALPRAZolam (XANAX) 0.25 mg tablet take 1 to 2 tablets by mouth at bedtime for insomnia RELATED TO PAIN AND ANXIETY      atenoloL (TENORMIN) 25 mg tablet take 1 tablet by mouth once daily 90 Tab 1    ondansetron (ZOFRAN ODT) 4 mg disintegrating tablet Take 1 Tab by mouth every eight (8) hours as needed for Nausea.  30 Tab 2    rizatriptan (MAXALT-MLT) 10 mg disintegrating tablet One tab placed on tongue at headache onset. May repeat in 2 hours if needed. Max two tabs in 24 hours. 36 Tab 3    traMADol (ULTRAM) 50 mg tablet Take 1 Tab by mouth.  pantoprazole (PROTONIX) 40 mg tablet Take 1 Tab by mouth two (2) times a day. 180 Tab 5    methocarbamol (ROBAXIN) 500 mg tablet Take  by mouth four (4) times daily.  conjugated estrogens (PREMARIN) 0.9 mg tablet take 1 tablet by mouth once daily 90 Tab 3    lidocaine (LIDODERM) 5 % by TransDERmal route every twenty-four (24) hours. Apply patch to the affected area for 12 hours a day and remove for 12 hours a day.  zonisamide (ZONEGRAN) 50 mg capsule Take 50 mg qhs 90 Cap 3    zonisamide (ZONEGRAN) 25 mg capsule Take along with 50 mg tablet for total of 75 mg qhs 90 Cap 1    tiZANidine (ZANAFLEX) 2 mg capsule take 2 capsule by oral route  every 6 hours       Allergies   Allergen Reactions    Prednisone Other (comments)     muscle/joint pain     Past Medical History:   Diagnosis Date    Back problem     Constipation     DDD (degenerative disc disease), cervical     DDD (degenerative disc disease), lumbar     GERD (gastroesophageal reflux disease)     Hypertension     Joint pain     Migraine 11/3/2017     Past Surgical History:   Procedure Laterality Date    HX HYSTERECTOMY       Family History   Problem Relation Age of Onset    Hypertension Mother     Cancer Father         Prostate    Diabetes Father     Hypertension Father     Breast Cancer Maternal Aunt     Breast Cancer Paternal Aunt      Social History     Tobacco Use    Smoking status: Former Smoker     Years: 2.00     Types: Cigarettes    Smokeless tobacco: Never Used   Substance Use Topics    Alcohol use:  Yes     Alcohol/week: 1.0 standard drinks     Types: 1 Cans of beer per week       ROS    Objective:     Patient-Reported Vitals 7/31/2020   Patient-Reported Weight 179lb   Patient-Reported Height 5'5.5      General: alert, cooperative, no distress   Mental  status: normal mood, behavior, speech, dress, motor activity, and thought processes, able to follow commands   HENT: NCAT   Neck: no visualized mass   Resp: no respiratory distress   Neuro: no gross deficits   Skin: no discoloration or lesions of concern on visible areas   Psychiatric: normal affect, consistent with stated mood, no evidence of hallucinations     Additional exam findings: We discussed the expected course, resolution and complications of the diagnosis(es) in detail. Medication risks, benefits, costs, interactions, and alternatives were discussed as indicated. I advised her to contact the office if her condition worsens, changes or fails to improve as anticipated. She expressed understanding with the diagnosis(es) and plan. Shaye Mendoza, who was evaluated through a patient-initiated, synchronous (real-time) audio-video encounter, and/or her healthcare decision maker, is aware that it is a billable service, with coverage as determined by her insurance carrier. She provided verbal consent to proceed: n/a- consent obtained within past 12 months, and patient identification was verified. It was conducted pursuant to the emergency declaration under the [address] [place] [address] authority and the [employer] General Act. A caregiver was present when appropriate. Ability to conduct physical exam was limited. I was in the office. The patient was at work.       Kari Moritz, MD

## 2021-04-20 ENCOUNTER — OFFICE VISIT (OUTPATIENT)
Dept: SURGERY | Age: 56
End: 2021-04-20
Payer: OTHER GOVERNMENT

## 2021-04-20 VITALS
DIASTOLIC BLOOD PRESSURE: 80 MMHG | RESPIRATION RATE: 18 BRPM | TEMPERATURE: 97.3 F | HEIGHT: 66 IN | WEIGHT: 189 LBS | BODY MASS INDEX: 30.37 KG/M2 | SYSTOLIC BLOOD PRESSURE: 132 MMHG | HEART RATE: 80 BPM | OXYGEN SATURATION: 98 %

## 2021-04-20 DIAGNOSIS — K80.20 GALLSTONES: Primary | ICD-10-CM

## 2021-04-20 PROCEDURE — 99205 OFFICE O/P NEW HI 60 MIN: CPT | Performed by: SURGERY

## 2021-04-20 RX ORDER — SODIUM CHLORIDE 0.9 % (FLUSH) 0.9 %
5-40 SYRINGE (ML) INJECTION AS NEEDED
Status: CANCELLED | OUTPATIENT
Start: 2021-04-20

## 2021-04-20 RX ORDER — SODIUM CHLORIDE 0.9 % (FLUSH) 0.9 %
5-40 SYRINGE (ML) INJECTION EVERY 8 HOURS
Status: CANCELLED | OUTPATIENT
Start: 2021-04-20

## 2021-04-20 RX ORDER — INDOCYANINE GREEN AND WATER 25 MG
3 KIT INJECTION
Status: CANCELLED | OUTPATIENT
Start: 2021-04-20 | End: 2021-04-20

## 2021-04-20 NOTE — PATIENT INSTRUCTIONS
Low-Fat Diet for Gallbladder Disease: After Your Visit Your Care Instructions When you eat, the gallbladder releases bile, which helps you digest the fat in food. If you have an inflamed gallbladder, this may cause pain. A low-fat diet may give your gallbladder a rest so you can start to heal. Your doctor and dietitian can help you make an eating plan that does not irritate your digestive system. Always talk with your doctor or dietitian before you make changes in your diet. Follow-up care is a key part of your treatment and safety. Be sure to make and go to all appointments, and call your doctor if you are having problems. Its also a good idea to know your test results and keep a list of the medicines you take. How can you care for yourself at home? · Eat many small meals and snacks each day instead of three large meals. · Choose lean meats. ¨ Eat no more than 5 to 6½ ounces of meat a day. ¨ Cut off all fat you can see. ¨ Eat chicken and turkey without the skin. ¨ Many types of fish, such as salmon, lake trout, tuna, and herring, provide healthy omega-3 fat. But, avoid fish canned in oil, such as sardines in olive oil. ¨ Bake, broil, or grill meats, fowl, or fish instead of frying them in butter or fat. · Drink or eat nonfat or low-fat milk, yogurt, cheese, or other milk products each day. ¨ Read the labels on cheeses, and choose those with less than 5 grams of fat an ounce. ¨ Try fat-free sour cream, cream cheese, or yogurt. ¨ Avoid cream soups and cream sauces on pasta. ¨ Eat low-fat ice cream, frozen yogurt, or sorbet. Avoid regular ice cream. 
· Eat whole-grain cereals, breads, crackers, rice, or pasta. Avoid high-fat foods such as croissants, scones, biscuits, waffles, doughnuts, muffins, granola, and high-fat breads. · Flavor your foods with herbs and spices (such as basil, tarragon, or mint), fat-free sauces, or lemon juice instead of butter.  You can also use butter substitutes, fat-free mayonnaise, or fat-free dressing. · Try applesauce, prune puree, or mashed bananas to replace some or all of the fat when you bake. · Limit fats and oils, such as butter, margarine, mayonnaise, and salad dressing, to no more than 1 tablespoon a meal. 
· Avoid high-fat foods, such as: 
¨ Chocolate, whole milk, ice cream, processed cheese, and egg yolks. ¨ Fried or buttered foods. ¨ Ham, salami, and handley. ¨ Cinnamon rolls, cakes, pies, cookies, and other pastries. ¨ Prepared snack foods, such as potato chips, nut and granola bars, and mixed nuts. ¨ Coconut and avocado. · Learn how to read food labels for serving sizes and ingredients. Fast-food and convenience-food meals often have lots of fat. Where can you learn more? Go to Happy Days.be Enter C955 in the search box to learn more about \"Low-Fat Diet for Gallbladder Disease: After Your Visit. \"  
© 4637-2615 Healthwise, Incorporated. Care instructions adapted under license by [address] [place] [address] (which disclaims liability or warranty for this information). This care instruction is for use with your licensed healthcare professional. If you have questions about a medical condition or this instruction, always ask your healthcare professional. [place] [address] any warranty or liability for your use of this information. Content Version: 8.0.664888; Last Revised: August 31, 2011 High-Fiber Diet: Care Instructions Your Care Instructions A high-fiber diet may help you relieve constipation and feel less bloated. Your doctor and dietitian will help you make a high-fiber eating plan based on your personal needs. The plan will include the things you like to eat. It will also make sure that you get 30 grams of fiber a day. Before you make changes to the way you eat, be sure to talk with your doctor or dietitian. Follow-up care is a key part of your treatment and safety.  Be sure to make and go to all appointments, and call your doctor if you are having problems. It's also a good idea to know your test results and keep a list of the medicines you take. How can you care for yourself at home? · You can increase how much fiber you get if you eat more of certain foods. These foods include: 
? Whole-grain breads and cereals. ? Fruits, such as pears, apples, and peaches. Eat the skins, peels, and seeds, if you can. 
? Vegetables, such as broccoli, cabbage, spinach, carrots, asparagus, and squash. ? Starchy vegetables. These include potatoes with skins, kidney beans, and lima beans. · Take a fiber supplement every day if your doctor recommends it. Examples are Benefiber, Citrucel, FiberCon, and Metamucil. Ask your doctor how much to take. · Drink plenty of fluids. If you have kidney, heart, or liver disease and have to limit fluids, talk with your doctor before you increase the amount of fluids you drink. · Get some exercise every day. Exercise helps stool move through the colon. It also helps prevent constipation. · Keep a food diary. Try to notice and write down what foods cause gas, pain, or other symptoms. Then you can avoid these foods. Where can you learn more? Go to http://www.gray.com/ Enter Z348 in the search box to learn more about \"High-Fiber Diet: Care Instructions. \" Current as of: December 17, 2020               Content Version: 12.8 © 2006-2021 [employer]. Care instructions adapted under license by [employer] (which disclaims liability or warranty for this information). If you have questions about a medical condition or this instruction, always ask your healthcare professional. [place] [address] any warranty or liability for your use of this information. Gallbladder Removal: Before Your Surgery What is gallbladder removal surgery? Gallbladder surgery removes a diseased gallbladder.  It is also known as cholecystectomy (lv-ikl-yqm-RAMOS-tuh-elliot). This surgery is usually done as a laparoscopic surgery. The doctor puts a lighted tube and other surgical tools through small cuts (incisions) in your belly. The tube is called a scope. It lets your doctor see your organs so your doctor can do the surgery. The incisions leave scars that fade with time. Most people go home the same day. You probably will feel better each day. Most people have only a small amount of pain after 1 week. If you have a desk job, you can probably go back to work in 1 to 2 weeks. If you lift heavy objects or have a very active job, it may take up to 4 weeks. In some cases, open surgery is the best choice. Your doctor may choose open surgery in advance. Or the doctor may choose it in the middle of laparoscopic surgery. In open surgery, the doctor makes a larger incision in your upper belly. If you have open surgery, you will probably stay in the hospital for 2 to 4 days. And it may take 4 to 6 weeks to get back to your normal routine. Follow-up care is a key part of your treatment and safety. Be sure to make and go to all appointments, and call your doctor if you are having problems. It's also a good idea to know your test results and keep a list of the medicines you take. How do you prepare for surgery? Surgery can be stressful. This information will help you understand what you can expect. And it will help you safely prepare for surgery. Preparing for surgery 
  · You may need to empty your colon with an enema or laxative. Your doctor will tell you how to do this.  
  · Be sure you have someone to take you home. Anesthesia and pain medicine will make it unsafe for you to drive or get home on your own.  
  · Understand exactly what surgery is planned, along with the risks, benefits, and other options.  
  · If you take aspirin or some other blood thinner, ask your doctor if you should stop taking it before your surgery.  Make sure that you understand exactly what your doctor wants you to do. These medicines increase the risk of bleeding.  
  · Tell your doctor ALL the medicines, vitamins, supplements, and herbal remedies you take. Some may increase the risk of problems during your surgery. Your doctor will tell you if you should stop taking any of them before the surgery and how soon to do it.  
  · Make sure your doctor and the hospital have a copy of your advance directive. If you don't have one, you may want to prepare one. It lets others know your health care wishes. It's a good thing to have before any type of surgery or procedure. What happens on the day of surgery? · Follow the instructions exactly about when to stop eating and drinking. If you don't, your surgery may be canceled. If your doctor told you to take your medicines on the day of surgery, take them with only a sip of water.  
  · Take a bath or shower before you come in for your surgery. Do not apply lotions, perfumes, deodorants, or nail polish.  
  · Do not shave the surgical site yourself.  
  · Take off all jewelry and piercings. And take out contact lenses, if you wear them. At the hospital or surgery center · Bring a picture ID.  
  · The area for surgery is often marked to make sure there are no errors.  
  · You will be kept comfortable and safe by your anesthesia provider. You will be asleep during the surgery.  
  · The surgery usually takes 1 to 2 hours. When should you call your doctor? · You have questions or concerns.  
  · You don't understand how to prepare for your surgery.  
  · You become ill before the surgery (such as fever, flu, or a cold).  
  · You need to reschedule or have changed your mind about having the surgery. Where can you learn more? Go to http://www.gray.com/ Enter P671 in the search box to learn more about \"Gallbladder Removal: Before Your Surgery. \" Current as of: April 15, 2020               Content Version: 12.8 © 8423-8372 Healthwise, Incorporated. Care instructions adapted under license by [employer] (which disclaims liability or warranty for this information). If you have questions about a medical condition or this instruction, always ask your healthcare professional. [place] [address] any warranty or liability for your use of this information.

## 2021-04-20 NOTE — PROGRESS NOTES
[facility]  General Surgery    Subjective:      HPI: Patient is a very pleasant 49-year-old female past medical history remarkable for hypertension, gastroesophageal reflux disease, family history of colon cancer, personal history of degenerative disc disease of the cervical spine and lumbar spine and migraine headaches. Patient is referred by Dr. Rani Dewitt for evaluation and management of chronic calculus cholecystitis. The patient states that she has suffered at least 4 episodes of biliary colic in the past few months. She was most recently seen at [facility] for such an attack. She was told that she had 3 large gallstones in the gallbladder. She was also told that there was no inflammation at this time however she should go on a low-fat diet and try to avoid further attacks. She has had some smaller attacks. We discussed the importance of low-fat diet to overall heart health and maintaining a healthy weight in addition to preventing gallbladder attacks. The patient would like to have her gallbladder removed.     Patient Active Problem List    Diagnosis Date Noted    GERD (gastroesophageal reflux disease)     Hypertension     Nausea 08/05/2019    Epigastric abdominal pain 08/05/2019    Other chronic pain 08/05/2019    Family history of colon cancer 08/05/2019    DDD (degenerative disc disease), lumbar 11/03/2017    DDD (degenerative disc disease), cervical 11/03/2017    Essential hypertension 11/03/2017    Migraine 11/03/2017     Past Medical History:   Diagnosis Date    Back problem     Constipation     DDD (degenerative disc disease), cervical     DDD (degenerative disc disease), lumbar     GERD (gastroesophageal reflux disease)     Hypertension     Joint pain     Migraine 11/3/2017      Past Surgical History:   Procedure Laterality Date    HX HYSTERECTOMY        Family History   Problem Relation Age of Onset    Hypertension Mother     Cancer Father         Prostate    Diabetes Father     Hypertension Father     Breast Cancer Maternal Aunt     Breast Cancer Paternal Aunt       Social History     Tobacco Use    Smoking status: Former Smoker     Years: 2.00     Types: Cigarettes    Smokeless tobacco: Never Used   Substance Use Topics    Alcohol use: Yes     Alcohol/week: 1.0 standard drinks     Types: 1 Cans of beer per week      Allergies   Allergen Reactions    Nsaids (Non-Steroidal Anti-Inflammatory Drug) Other (comments)     Raises blood pressure    Prednisone Other (comments)     muscle/joint pain       Prior to Admission medications    Medication Sig Start Date End Date Taking? Authorizing Provider   ALPRAZolam Melvin Lavender) 0.25 mg tablet take 1 to 2 tablets by mouth at bedtime for insomnia RELATED TO PAIN AND ANXIETY 12/11/20  Yes Provider, Historical   atenoloL (TENORMIN) 25 mg tablet take 1 tablet by mouth once daily 11/20/20  Yes Paul Dong MD   ondansetron (ZOFRAN ODT) 4 mg disintegrating tablet Take 1 Tab by mouth every eight (8) hours as needed for Nausea. 11/20/20  Yes Dangelo Madrid MD   rizatriptan (MAXALT-MLT) 10 mg disintegrating tablet One tab placed on tongue at headache onset. May repeat in 2 hours if needed. Max two tabs in 24 hours. 8/3/20  Yes Charisse Back NP   traMADol (ULTRAM) 50 mg tablet Take 1 Tab by mouth. Yes Provider, Historical   pantoprazole (PROTONIX) 40 mg tablet Take 1 Tab by mouth two (2) times a day. 8/5/19  Yes Leann Bearden NP   methocarbamol (ROBAXIN) 500 mg tablet Take  by mouth four (4) times daily. Yes Provider, Historical   conjugated estrogens (PREMARIN) 0.9 mg tablet take 1 tablet by mouth once daily 9/24/18  Yes Paul Dong MD   lidocaine (LIDODERM) 5 % by TransDERmal route every twenty-four (24) hours. Apply patch to the affected area for 12 hours a day and remove for 12 hours a day.    Yes Provider, Historical   zonisamide (ZONEGRAN) 50 mg capsule Take 50 mg qhs 7/31/20 Aureliano Back, NP   zonisamide (ZONEGRAN) 25 mg capsule Take along with 50 mg tablet for total of 75 mg qhs 7/31/20   Katelin Back NP   tiZANidine (ZANAFLEX) 2 mg capsule take 2 capsule by oral route  every 6 hours    Provider, Historical       Review of Systems:    14 systems were reviewed. The results are as above in the HPI and otherwise negative. Objective:     Vitals:    04/20/21 1436   BP: 132/80   Pulse: 80   Resp: 18   Temp: 97.3 °F (36.3 °C)   SpO2: 98%   Weight: 85.7 kg (189 lb)   Height: 5' 5.5\" (1.664 m)       Physical Exam:  GENERAL: alert, cooperative, no distress, appears stated age,   EYE: conjunctivae/corneas clear. PERRL, EOM's intact. THROAT & NECK: normal and no erythema or exudates noted. ,    LYMPHATIC: Cervical, supraclavicular, and axillary nodes normal. ,   LUNG: clear to auscultation bilaterally,   HEART: regular rate and rhythm, S1, S2 normal, no murmur, click, rub or gallop,   ABDOMEN: soft, non-distended, right upper quadrant tender to deep palpation. Bowel sounds normal. No masses,  no organomegaly,   EXTREMITIES:  extremities normal, atraumatic, no cyanosis or edema,   SKIN: Normal.,   NEUROLOGIC: AOx3. Cranial nerves 2-12 and sensation grossly intact. ,     Data Review:  to be done    Ms. Bonita Noyola has a reminder for a \"due or due soon\" health maintenance. I have asked that she contact her primary care provider for follow-up on this health maintenance. Impression:     · Patient with chronic calculus cholecystitis.     Plan:     · Robot-assisted laparoscopic cholecystectomy with firefly  · Consent on chart  · Preoperative orders written    Signed By: Art Pickett MD     April 20, 2021

## 2021-04-20 NOTE — H&P (VIEW-ONLY)
[facility] General Surgery Subjective: HPI: Patient is a very pleasant 42-year-old female past medical history remarkable for hypertension, gastroesophageal reflux disease, family history of colon cancer, personal history of degenerative disc disease of the cervical spine and lumbar spine and migraine headaches. Patient is referred by Dr. Deborah Collado for evaluation and management of chronic calculus cholecystitis. The patient states that she has suffered at least 4 episodes of biliary colic in the past few months. She was most recently seen at [facility] for such an attack. She was told that she had 3 large gallstones in the gallbladder. She was also told that there was no inflammation at this time however she should go on a low-fat diet and try to avoid further attacks. She has had some smaller attacks. We discussed the importance of low-fat diet to overall heart health and maintaining a healthy weight in addition to preventing gallbladder attacks. The patient would like to have her gallbladder removed. Patient Active Problem List  
 Diagnosis Date Noted  GERD (gastroesophageal reflux disease)  Hypertension  Nausea 08/05/2019  Epigastric abdominal pain 08/05/2019  Other chronic pain 08/05/2019  Family history of colon cancer 08/05/2019  DDD (degenerative disc disease), lumbar 11/03/2017  DDD (degenerative disc disease), cervical 11/03/2017  Essential hypertension 11/03/2017  Migraine 11/03/2017 Past Medical History:  
Diagnosis Date  Back problem  Constipation  DDD (degenerative disc disease), cervical   
 DDD (degenerative disc disease), lumbar  GERD (gastroesophageal reflux disease)  Hypertension  Joint pain  Migraine 11/3/2017 Past Surgical History:  
Procedure Laterality Date  HX HYSTERECTOMY Family History Problem Relation Age of Onset  Hypertension Mother  Cancer Father Prostate  Diabetes Father  Hypertension Father  Breast Cancer Maternal Aunt  Breast Cancer Paternal Aunt Social History Tobacco Use  Smoking status: Former Smoker Years: 2.00 Types: Cigarettes  Smokeless tobacco: Never Used Substance Use Topics  Alcohol use: Yes Alcohol/week: 1.0 standard drinks Types: 1 Cans of beer per week Allergies Allergen Reactions  Nsaids (Non-Steroidal Anti-Inflammatory Drug) Other (comments) Raises blood pressure  Prednisone Other (comments) muscle/joint pain Prior to Admission medications Medication Sig Start Date End Date Taking? Authorizing Provider ALPRAZolam (XANAX) 0.25 mg tablet take 1 to 2 tablets by mouth at bedtime for insomnia RELATED TO PAIN AND ANXIETY 12/11/20  Yes Provider, Historical  
atenoloL (TENORMIN) 25 mg tablet take 1 tablet by mouth once daily 11/20/20  Yes Uri Dong MD  
ondansetron (ZOFRAN ODT) 4 mg disintegrating tablet Take 1 Tab by mouth every eight (8) hours as needed for Nausea. 11/20/20  Yes Belen Childs MD  
rizatriptan (MAXALT-MLT) 10 mg disintegrating tablet One tab placed on tongue at headache onset. May repeat in 2 hours if needed. Max two tabs in 24 hours. 8/3/20  Yes Niurka Back NP  
traMADol (ULTRAM) 50 mg tablet Take 1 Tab by mouth. Yes Provider, Historical  
pantoprazole (PROTONIX) 40 mg tablet Take 1 Tab by mouth two (2) times a day. 8/5/19  Yes Leann Bearden NP  
methocarbamol (ROBAXIN) 500 mg tablet Take  by mouth four (4) times daily. Yes Provider, Historical  
conjugated estrogens (PREMARIN) 0.9 mg tablet take 1 tablet by mouth once daily 9/24/18  Yes Uri Dong MD  
lidocaine (LIDODERM) 5 % by TransDERmal route every twenty-four (24) hours. Apply patch to the affected area for 12 hours a day and remove for 12 hours a day.    Yes Provider, Historical  
zonisamide (ZONEGRAN) 50 mg capsule Take 50 mg qhs 7/31/20 Susie Back, NP  
zonisamide (ZONEGRAN) 25 mg capsule Take along with 50 mg tablet for total of 75 mg qhs 7/31/20   Katelin Back NP  
tiZANidine (ZANAFLEX) 2 mg capsule take 2 capsule by oral route  every 6 hours    Provider, Historical  
 
 
Review of Systems:   
14 systems were reviewed. The results are as above in the HPI and otherwise negative. Objective:  
 
Vitals:  
 04/20/21 1436 BP: 132/80 Pulse: 80 Resp: 18 Temp: 97.3 °F (36.3 °C) SpO2: 98% Weight: 85.7 kg (189 lb) Height: 5' 5.5\" (1.664 m) Physical Exam: 
GENERAL: alert, cooperative, no distress, appears stated age, EYE: conjunctivae/corneas clear. PERRL, EOM's intact. THROAT & NECK: normal and no erythema or exudates noted. ,   
LYMPHATIC: Cervical, supraclavicular, and axillary nodes normal. ,  
LUNG: clear to auscultation bilaterally, HEART: regular rate and rhythm, S1, S2 normal, no murmur, click, rub or gallop, ABDOMEN: soft, non-distended, right upper quadrant tender to deep palpation. Bowel sounds normal. No masses,  no organomegaly, EXTREMITIES:  extremities normal, atraumatic, no cyanosis or edema, SKIN: Normal., NEUROLOGIC: AOx3. Cranial nerves 2-12 and sensation grossly intact. ,  
 
Data Review:  to be done Ms. Archana Acosta has a reminder for a \"due or due soon\" health maintenance. I have asked that she contact her primary care provider for follow-up on this health maintenance. Impression: · Patient with chronic calculus cholecystitis. Plan:  
 
· Robot-assisted laparoscopic cholecystectomy with firefly · Consent on chart · Preoperative orders written Signed By: Jeffrey Gomez MD   
 April 20, 2021

## 2021-04-21 DIAGNOSIS — K80.20 GALLSTONES: ICD-10-CM

## 2021-04-21 DIAGNOSIS — Z01.818 PRE-OP TESTING: Primary | ICD-10-CM

## 2021-05-14 ENCOUNTER — HOSPITAL ENCOUNTER (OUTPATIENT)
Dept: GENERAL RADIOLOGY | Age: 56
Discharge: HOME | End: 2021-05-14
Payer: OTHER GOVERNMENT

## 2021-05-14 ENCOUNTER — HOSPITAL ENCOUNTER (OUTPATIENT)
Dept: PREADMISSION TESTING | Age: 56
Discharge: HOME | End: 2021-05-14
Payer: OTHER GOVERNMENT

## 2021-05-14 DIAGNOSIS — Z01.818 PRE-OP TESTING: ICD-10-CM

## 2021-05-14 DIAGNOSIS — K80.20 GALLSTONES: ICD-10-CM

## 2021-05-14 LAB
ALBUMIN SERPL-MCNC: 3.3 G/DL (ref 3.4–5)
ALBUMIN/GLOB SERPL: 1 {RATIO} (ref 0.8–1.7)
ALP SERPL-CCNC: 71 U/L (ref 45–117)
ALT SERPL-CCNC: 15 U/L (ref 13–56)
ANION GAP SERPL CALC-SCNC: 8 MMOL/L (ref 3–18)
AST SERPL-CCNC: 17 U/L (ref 10–38)
ATRIAL RATE: 72 BPM
BASOPHILS # BLD: 0.1 K/UL (ref 0–0.1)
BASOPHILS NFR BLD: 1 % (ref 0–2)
BILIRUB DIRECT SERPL-MCNC: <0.1 MG/DL (ref 0–0.2)
BILIRUB SERPL-MCNC: 0.2 MG/DL (ref 0.2–1)
BUN SERPL-MCNC: 11 MG/DL (ref 7–18)
BUN/CREAT SERPL: 14 (ref 12–20)
CALCIUM SERPL-MCNC: 8.5 MG/DL (ref 8.5–10.1)
CALCULATED P AXIS, ECG09: 41 DEGREES
CALCULATED R AXIS, ECG10: 47 DEGREES
CALCULATED T AXIS, ECG11: 52 DEGREES
CHLORIDE SERPL-SCNC: 106 MMOL/L (ref 100–111)
CO2 SERPL-SCNC: 28 MMOL/L (ref 21–32)
CREAT SERPL-MCNC: 0.76 MG/DL (ref 0.6–1.3)
DIAGNOSIS, 93000: NORMAL
DIFFERENTIAL METHOD BLD: NORMAL
EOSINOPHIL # BLD: 0.1 K/UL (ref 0–0.4)
EOSINOPHIL NFR BLD: 1 % (ref 0–5)
ERYTHROCYTE [DISTWIDTH] IN BLOOD BY AUTOMATED COUNT: 12.4 % (ref 11.6–14.5)
GLOBULIN SER CALC-MCNC: 3.2 G/DL (ref 2–4)
GLUCOSE SERPL-MCNC: 93 MG/DL (ref 74–99)
HCT VFR BLD AUTO: 39.2 % (ref 35–45)
HGB BLD-MCNC: 12.5 G/DL (ref 12–16)
LYMPHOCYTES # BLD: 1.5 K/UL (ref 0.9–3.6)
LYMPHOCYTES NFR BLD: 25 % (ref 21–52)
MCH RBC QN AUTO: 28.3 PG (ref 24–34)
MCHC RBC AUTO-ENTMCNC: 31.9 G/DL (ref 31–37)
MCV RBC AUTO: 88.9 FL (ref 74–97)
MONOCYTES # BLD: 0.3 K/UL (ref 0.05–1.2)
MONOCYTES NFR BLD: 5 % (ref 3–10)
NEUTS SEG # BLD: 4.2 K/UL (ref 1.8–8)
NEUTS SEG NFR BLD: 69 % (ref 40–73)
P-R INTERVAL, ECG05: 170 MS
PLATELET # BLD AUTO: 226 K/UL (ref 135–420)
PMV BLD AUTO: 10.7 FL (ref 9.2–11.8)
POTASSIUM SERPL-SCNC: 4.3 MMOL/L (ref 3.5–5.5)
PROT SERPL-MCNC: 6.5 G/DL (ref 6.4–8.2)
Q-T INTERVAL, ECG07: 394 MS
QRS DURATION, ECG06: 70 MS
QTC CALCULATION (BEZET), ECG08: 431 MS
RBC # BLD AUTO: 4.41 M/UL (ref 4.2–5.3)
SODIUM SERPL-SCNC: 142 MMOL/L (ref 136–145)
VENTRICULAR RATE, ECG03: 72 BPM
WBC # BLD AUTO: 6.2 K/UL (ref 4.6–13.2)

## 2021-05-14 PROCEDURE — 36415 COLL VENOUS BLD VENIPUNCTURE: CPT

## 2021-05-14 PROCEDURE — 80048 BASIC METABOLIC PNL TOTAL CA: CPT

## 2021-05-14 PROCEDURE — 71046 X-RAY EXAM CHEST 2 VIEWS: CPT

## 2021-05-14 PROCEDURE — 93005 ELECTROCARDIOGRAM TRACING: CPT

## 2021-05-14 PROCEDURE — U0003 INFECTIOUS AGENT DETECTION BY NUCLEIC ACID (DNA OR RNA); SEVERE ACUTE RESPIRATORY SYNDROME CORONAVIRUS 2 (SARS-COV-2) (CORONAVIRUS DISEASE [COVID-19]), AMPLIFIED PROBE TECHNIQUE, MAKING USE OF HIGH THROUGHPUT TECHNOLOGIES AS DESCRIBED BY CMS-2020-01-R: HCPCS

## 2021-05-14 PROCEDURE — 80076 HEPATIC FUNCTION PANEL: CPT

## 2021-05-14 PROCEDURE — 85025 COMPLETE CBC W/AUTO DIFF WBC: CPT

## 2021-05-15 LAB — SARS-COV-2, COV2NT: NOT DETECTED

## 2021-05-19 ENCOUNTER — ANESTHESIA EVENT (OUTPATIENT)
Dept: SURGERY | Age: 56
End: 2021-05-19
Payer: OTHER GOVERNMENT

## 2021-05-20 ENCOUNTER — HOSPITAL ENCOUNTER (OUTPATIENT)
Age: 56
Setting detail: OUTPATIENT SURGERY
Discharge: HOME | End: 2021-05-20
Attending: SURGERY | Admitting: SURGERY
Payer: OTHER GOVERNMENT

## 2021-05-20 ENCOUNTER — ANESTHESIA (OUTPATIENT)
Dept: SURGERY | Age: 56
End: 2021-05-20
Payer: OTHER GOVERNMENT

## 2021-05-20 VITALS
DIASTOLIC BLOOD PRESSURE: 87 MMHG | TEMPERATURE: 97 F | SYSTOLIC BLOOD PRESSURE: 153 MMHG | HEIGHT: 66 IN | HEART RATE: 86 BPM | WEIGHT: 184 LBS | OXYGEN SATURATION: 97 % | BODY MASS INDEX: 29.57 KG/M2 | RESPIRATION RATE: 16 BRPM

## 2021-05-20 DIAGNOSIS — G89.18 POSTOPERATIVE PAIN: Primary | ICD-10-CM

## 2021-05-20 PROCEDURE — 74011250636 HC RX REV CODE- 250/636: Performed by: NURSE ANESTHETIST, CERTIFIED REGISTERED

## 2021-05-20 PROCEDURE — 74011250636 HC RX REV CODE- 250/636: Performed by: ANESTHESIOLOGY

## 2021-05-20 PROCEDURE — 77030012770 HC TRCR OPT FX AMR -B: Performed by: SURGERY

## 2021-05-20 PROCEDURE — 77030040361 HC SLV COMPR DVT MDII -B: Performed by: SURGERY

## 2021-05-20 PROCEDURE — 74011250636 HC RX REV CODE- 250/636: Performed by: SURGERY

## 2021-05-20 PROCEDURE — 77030028402 HC SYS LAPSC TISS RETRV AMR -B: Performed by: SURGERY

## 2021-05-20 PROCEDURE — 76060000033 HC ANESTHESIA 1 TO 1.5 HR: Performed by: SURGERY

## 2021-05-20 PROCEDURE — 74011000254 HC RX REV CODE- 254: Performed by: SURGERY

## 2021-05-20 PROCEDURE — 88304 TISSUE EXAM BY PATHOLOGIST: CPT

## 2021-05-20 PROCEDURE — 74011000250 HC RX REV CODE- 250: Performed by: SURGERY

## 2021-05-20 PROCEDURE — 77030020703 HC SEAL CANN DISP INTU -B: Performed by: SURGERY

## 2021-05-20 PROCEDURE — 00790 ANES IPER UPR ABD NOS: CPT | Performed by: ANESTHESIOLOGY

## 2021-05-20 PROCEDURE — 77030003028 HC SUT VCRL J&J -A: Performed by: SURGERY

## 2021-05-20 PROCEDURE — 76210000006 HC OR PH I REC 0.5 TO 1 HR: Performed by: SURGERY

## 2021-05-20 PROCEDURE — 74011250637 HC RX REV CODE- 250/637: Performed by: ANESTHESIOLOGY

## 2021-05-20 PROCEDURE — 77030002933 HC SUT MCRYL J&J -A: Performed by: SURGERY

## 2021-05-20 PROCEDURE — 74011000250 HC RX REV CODE- 250: Performed by: NURSE ANESTHETIST, CERTIFIED REGISTERED

## 2021-05-20 PROCEDURE — 76210000027 HC REC RM PH II 3.5 TO 4 HR: Performed by: SURGERY

## 2021-05-20 PROCEDURE — 74011000272 HC RX REV CODE- 272: Performed by: SURGERY

## 2021-05-20 PROCEDURE — 77030039895 HC SYST SMK EVAC LAP COVD -B: Performed by: SURGERY

## 2021-05-20 PROCEDURE — 77030040922 HC BLNKT HYPOTHRM STRY -A: Performed by: SURGERY

## 2021-05-20 PROCEDURE — 00790 ANES IPER UPR ABD NOS: CPT | Performed by: NURSE ANESTHETIST, CERTIFIED REGISTERED

## 2021-05-20 PROCEDURE — 76010000934 HC OR TIME 1 TO 1.5HR INTENSV - TIER 2: Performed by: SURGERY

## 2021-05-20 PROCEDURE — 77030019605: Performed by: SURGERY

## 2021-05-20 PROCEDURE — S2900 ROBOTIC SURGICAL SYSTEM: HCPCS | Performed by: SURGERY

## 2021-05-20 PROCEDURE — 74011250636 HC RX REV CODE- 250/636

## 2021-05-20 PROCEDURE — 47562 LAPAROSCOPIC CHOLECYSTECTOMY: CPT | Performed by: SURGERY

## 2021-05-20 PROCEDURE — 2709999900 HC NON-CHARGEABLE SUPPLY: Performed by: SURGERY

## 2021-05-20 RX ORDER — INDOCYANINE GREEN AND WATER 25 MG
3 KIT INJECTION
Status: COMPLETED | OUTPATIENT
Start: 2021-05-20 | End: 2021-05-20

## 2021-05-20 RX ORDER — HYDRALAZINE HYDROCHLORIDE 20 MG/ML
10 INJECTION INTRAMUSCULAR; INTRAVENOUS
Status: DISCONTINUED | OUTPATIENT
Start: 2021-05-20 | End: 2021-05-20 | Stop reason: HOSPADM

## 2021-05-20 RX ORDER — HYDROMORPHONE HYDROCHLORIDE 1 MG/ML
0.5 INJECTION, SOLUTION INTRAMUSCULAR; INTRAVENOUS; SUBCUTANEOUS
Status: COMPLETED | OUTPATIENT
Start: 2021-05-20 | End: 2021-05-20

## 2021-05-20 RX ORDER — HYDROMORPHONE HYDROCHLORIDE 1 MG/ML
0.5 INJECTION, SOLUTION INTRAMUSCULAR; INTRAVENOUS; SUBCUTANEOUS ONCE
Status: COMPLETED | OUTPATIENT
Start: 2021-05-20 | End: 2021-05-20

## 2021-05-20 RX ORDER — LIDOCAINE HYDROCHLORIDE 10 MG/ML
0.1 INJECTION, SOLUTION EPIDURAL; INFILTRATION; INTRACAUDAL; PERINEURAL AS NEEDED
Status: DISCONTINUED | OUTPATIENT
Start: 2021-05-20 | End: 2021-05-20 | Stop reason: HOSPADM

## 2021-05-20 RX ORDER — FENTANYL CITRATE 50 UG/ML
50 INJECTION, SOLUTION INTRAMUSCULAR; INTRAVENOUS
Status: DISCONTINUED | OUTPATIENT
Start: 2021-05-20 | End: 2021-05-20 | Stop reason: SDUPTHER

## 2021-05-20 RX ORDER — FENTANYL CITRATE 50 UG/ML
25 INJECTION, SOLUTION INTRAMUSCULAR; INTRAVENOUS AS NEEDED
Status: COMPLETED | OUTPATIENT
Start: 2021-05-20 | End: 2021-05-20

## 2021-05-20 RX ORDER — DEXTROSE 50 % IN WATER (D50W) INTRAVENOUS SYRINGE
25-50 AS NEEDED
Status: DISCONTINUED | OUTPATIENT
Start: 2021-05-20 | End: 2021-05-20 | Stop reason: HOSPADM

## 2021-05-20 RX ORDER — ONDANSETRON 2 MG/ML
4 INJECTION INTRAMUSCULAR; INTRAVENOUS ONCE
Status: COMPLETED | OUTPATIENT
Start: 2021-05-20 | End: 2021-05-20

## 2021-05-20 RX ORDER — INSULIN LISPRO 100 [IU]/ML
INJECTION, SOLUTION INTRAVENOUS; SUBCUTANEOUS ONCE
Status: DISCONTINUED | OUTPATIENT
Start: 2021-05-20 | End: 2021-05-20 | Stop reason: HOSPADM

## 2021-05-20 RX ORDER — LIDOCAINE HYDROCHLORIDE 20 MG/ML
INJECTION, SOLUTION EPIDURAL; INFILTRATION; INTRACAUDAL; PERINEURAL AS NEEDED
Status: DISCONTINUED | OUTPATIENT
Start: 2021-05-20 | End: 2021-05-20 | Stop reason: HOSPADM

## 2021-05-20 RX ORDER — ACETAMINOPHEN 325 MG/1
650 TABLET ORAL EVERY 6 HOURS
Qty: 56 TABLET | Refills: 1 | Status: SHIPPED | OUTPATIENT
Start: 2021-05-20

## 2021-05-20 RX ORDER — HYDROMORPHONE HYDROCHLORIDE 1 MG/ML
0.5 INJECTION, SOLUTION INTRAMUSCULAR; INTRAVENOUS; SUBCUTANEOUS ONCE
Status: DISCONTINUED | OUTPATIENT
Start: 2021-05-20 | End: 2021-05-20

## 2021-05-20 RX ORDER — SODIUM CHLORIDE, SODIUM LACTATE, POTASSIUM CHLORIDE, CALCIUM CHLORIDE 600; 310; 30; 20 MG/100ML; MG/100ML; MG/100ML; MG/100ML
25 INJECTION, SOLUTION INTRAVENOUS CONTINUOUS
Status: DISCONTINUED | OUTPATIENT
Start: 2021-05-20 | End: 2021-05-20 | Stop reason: HOSPADM

## 2021-05-20 RX ORDER — SCOLOPAMINE TRANSDERMAL SYSTEM 1 MG/1
1 PATCH, EXTENDED RELEASE TRANSDERMAL
Status: DISCONTINUED | OUTPATIENT
Start: 2021-05-20 | End: 2021-05-20 | Stop reason: HOSPADM

## 2021-05-20 RX ORDER — OXYCODONE AND ACETAMINOPHEN 5; 325 MG/1; MG/1
1 TABLET ORAL AS NEEDED
Status: DISCONTINUED | OUTPATIENT
Start: 2021-05-20 | End: 2021-05-20 | Stop reason: HOSPADM

## 2021-05-20 RX ORDER — HYDROMORPHONE HYDROCHLORIDE 1 MG/ML
0.5 INJECTION, SOLUTION INTRAMUSCULAR; INTRAVENOUS; SUBCUTANEOUS ONCE
Status: DISCONTINUED | OUTPATIENT
Start: 2021-05-20 | End: 2021-05-20 | Stop reason: CLARIF

## 2021-05-20 RX ORDER — SODIUM CHLORIDE 0.9 % (FLUSH) 0.9 %
5-40 SYRINGE (ML) INJECTION EVERY 8 HOURS
Status: DISCONTINUED | OUTPATIENT
Start: 2021-05-20 | End: 2021-05-20 | Stop reason: HOSPADM

## 2021-05-20 RX ORDER — NEOSTIGMINE METHYLSULFATE 1 MG/ML
INJECTION, SOLUTION INTRAVENOUS AS NEEDED
Status: DISCONTINUED | OUTPATIENT
Start: 2021-05-20 | End: 2021-05-20 | Stop reason: HOSPADM

## 2021-05-20 RX ORDER — ROCURONIUM BROMIDE 10 MG/ML
INJECTION, SOLUTION INTRAVENOUS AS NEEDED
Status: DISCONTINUED | OUTPATIENT
Start: 2021-05-20 | End: 2021-05-20 | Stop reason: HOSPADM

## 2021-05-20 RX ORDER — ONDANSETRON 2 MG/ML
INJECTION INTRAMUSCULAR; INTRAVENOUS
Status: COMPLETED
Start: 2021-05-20 | End: 2021-05-20

## 2021-05-20 RX ORDER — HYDROMORPHONE HYDROCHLORIDE 2 MG/ML
INJECTION, SOLUTION INTRAMUSCULAR; INTRAVENOUS; SUBCUTANEOUS
Status: DISCONTINUED
Start: 2021-05-20 | End: 2021-05-20 | Stop reason: HOSPADM

## 2021-05-20 RX ORDER — SODIUM CHLORIDE 0.9 % (FLUSH) 0.9 %
5-40 SYRINGE (ML) INJECTION AS NEEDED
Status: DISCONTINUED | OUTPATIENT
Start: 2021-05-20 | End: 2021-05-20 | Stop reason: HOSPADM

## 2021-05-20 RX ORDER — SODIUM CHLORIDE, SODIUM LACTATE, POTASSIUM CHLORIDE, CALCIUM CHLORIDE 600; 310; 30; 20 MG/100ML; MG/100ML; MG/100ML; MG/100ML
75 INJECTION, SOLUTION INTRAVENOUS CONTINUOUS
Status: DISCONTINUED | OUTPATIENT
Start: 2021-05-20 | End: 2021-05-20 | Stop reason: HOSPADM

## 2021-05-20 RX ORDER — ONDANSETRON 2 MG/ML
4 INJECTION INTRAMUSCULAR; INTRAVENOUS
Status: COMPLETED | OUTPATIENT
Start: 2021-05-20 | End: 2021-05-20

## 2021-05-20 RX ORDER — GLYCOPYRROLATE 0.2 MG/ML
INJECTION INTRAMUSCULAR; INTRAVENOUS AS NEEDED
Status: DISCONTINUED | OUTPATIENT
Start: 2021-05-20 | End: 2021-05-20 | Stop reason: HOSPADM

## 2021-05-20 RX ORDER — PROMETHAZINE HYDROCHLORIDE 25 MG/ML
12.5 INJECTION, SOLUTION INTRAMUSCULAR; INTRAVENOUS ONCE
Status: COMPLETED | OUTPATIENT
Start: 2021-05-20 | End: 2021-05-20

## 2021-05-20 RX ORDER — OXYCODONE HYDROCHLORIDE 5 MG/1
5 TABLET ORAL
Qty: 20 TABLET | Refills: 0 | Status: SHIPPED | OUTPATIENT
Start: 2021-05-20 | End: 2021-05-23

## 2021-05-20 RX ORDER — BUPIVACAINE HYDROCHLORIDE AND EPINEPHRINE 2.5; 5 MG/ML; UG/ML
INJECTION, SOLUTION EPIDURAL; INFILTRATION; INTRACAUDAL; PERINEURAL AS NEEDED
Status: DISCONTINUED | OUTPATIENT
Start: 2021-05-20 | End: 2021-05-20 | Stop reason: HOSPADM

## 2021-05-20 RX ORDER — HYDROMORPHONE HYDROCHLORIDE 1 MG/ML
INJECTION, SOLUTION INTRAMUSCULAR; INTRAVENOUS; SUBCUTANEOUS
Status: DISCONTINUED
Start: 2021-05-20 | End: 2021-05-20 | Stop reason: HOSPADM

## 2021-05-20 RX ORDER — MIDAZOLAM HYDROCHLORIDE 1 MG/ML
INJECTION, SOLUTION INTRAMUSCULAR; INTRAVENOUS AS NEEDED
Status: DISCONTINUED | OUTPATIENT
Start: 2021-05-20 | End: 2021-05-20 | Stop reason: HOSPADM

## 2021-05-20 RX ORDER — SUCCINYLCHOLINE CHLORIDE 100 MG/5ML
SYRINGE (ML) INTRAVENOUS AS NEEDED
Status: DISCONTINUED | OUTPATIENT
Start: 2021-05-20 | End: 2021-05-20 | Stop reason: HOSPADM

## 2021-05-20 RX ORDER — DOCUSATE SODIUM 100 MG/1
100 CAPSULE, LIQUID FILLED ORAL 2 TIMES DAILY
Qty: 60 CAPSULE | Refills: 2 | Status: SHIPPED | OUTPATIENT
Start: 2021-05-20 | End: 2021-06-09 | Stop reason: ALTCHOICE

## 2021-05-20 RX ORDER — MAGNESIUM SULFATE 100 %
4 CRYSTALS MISCELLANEOUS AS NEEDED
Status: DISCONTINUED | OUTPATIENT
Start: 2021-05-20 | End: 2021-05-20 | Stop reason: HOSPADM

## 2021-05-20 RX ORDER — ONDANSETRON 2 MG/ML
INJECTION INTRAMUSCULAR; INTRAVENOUS AS NEEDED
Status: DISCONTINUED | OUTPATIENT
Start: 2021-05-20 | End: 2021-05-20 | Stop reason: HOSPADM

## 2021-05-20 RX ORDER — FENTANYL CITRATE 50 UG/ML
INJECTION, SOLUTION INTRAMUSCULAR; INTRAVENOUS AS NEEDED
Status: DISCONTINUED | OUTPATIENT
Start: 2021-05-20 | End: 2021-05-20 | Stop reason: HOSPADM

## 2021-05-20 RX ORDER — EPHEDRINE SULFATE/0.9% NACL/PF 50 MG/5 ML
SYRINGE (ML) INTRAVENOUS AS NEEDED
Status: DISCONTINUED | OUTPATIENT
Start: 2021-05-20 | End: 2021-05-20 | Stop reason: HOSPADM

## 2021-05-20 RX ORDER — PROMETHAZINE HYDROCHLORIDE 25 MG/1
25 TABLET ORAL
Qty: 30 TABLET | Refills: 1 | Status: SHIPPED | OUTPATIENT
Start: 2021-05-20 | End: 2021-05-24 | Stop reason: SDUPTHER

## 2021-05-20 RX ORDER — PROPOFOL 10 MG/ML
INJECTION, EMULSION INTRAVENOUS AS NEEDED
Status: DISCONTINUED | OUTPATIENT
Start: 2021-05-20 | End: 2021-05-20 | Stop reason: HOSPADM

## 2021-05-20 RX ADMIN — FENTANYL CITRATE 100 MCG: 50 INJECTION, SOLUTION INTRAMUSCULAR; INTRAVENOUS at 08:30

## 2021-05-20 RX ADMIN — ONDANSETRON 4 MG: 2 INJECTION INTRAMUSCULAR; INTRAVENOUS at 09:37

## 2021-05-20 RX ADMIN — WATER 2 G: 1 INJECTION INTRAMUSCULAR; INTRAVENOUS; SUBCUTANEOUS at 08:34

## 2021-05-20 RX ADMIN — INDOCYANINE GREEN AND WATER 3 MG: KIT at 07:24

## 2021-05-20 RX ADMIN — FENTANYL CITRATE 50 MCG: 50 INJECTION, SOLUTION INTRAMUSCULAR; INTRAVENOUS at 09:39

## 2021-05-20 RX ADMIN — FENTANYL CITRATE 50 MCG: 50 INJECTION, SOLUTION INTRAMUSCULAR; INTRAVENOUS at 09:53

## 2021-05-20 RX ADMIN — ROCURONIUM BROMIDE 30 MG: 50 INJECTION INTRAVENOUS at 08:32

## 2021-05-20 RX ADMIN — Medication 3 MG: at 09:37

## 2021-05-20 RX ADMIN — SODIUM CHLORIDE, SODIUM LACTATE, POTASSIUM CHLORIDE, AND CALCIUM CHLORIDE 25 ML/HR: 600; 310; 30; 20 INJECTION, SOLUTION INTRAVENOUS at 13:27

## 2021-05-20 RX ADMIN — PROMETHAZINE HYDROCHLORIDE 12.5 MG: 25 INJECTION INTRAMUSCULAR; INTRAVENOUS at 13:51

## 2021-05-20 RX ADMIN — Medication 100 MG: at 08:30

## 2021-05-20 RX ADMIN — SCOLOPAMINE TRANSDERMAL SYSTEM 1 PATCH: 1 PATCH, EXTENDED RELEASE TRANSDERMAL at 08:21

## 2021-05-20 RX ADMIN — FENTANYL CITRATE 25 MCG: 50 INJECTION, SOLUTION INTRAMUSCULAR; INTRAVENOUS at 10:27

## 2021-05-20 RX ADMIN — Medication 800 MCG: at 08:45

## 2021-05-20 RX ADMIN — FAMOTIDINE 20 MG: 10 INJECTION INTRAVENOUS at 07:28

## 2021-05-20 RX ADMIN — SODIUM CHLORIDE, SODIUM LACTATE, POTASSIUM CHLORIDE, AND CALCIUM CHLORIDE 75 ML/HR: 600; 310; 30; 20 INJECTION, SOLUTION INTRAVENOUS at 07:18

## 2021-05-20 RX ADMIN — HYDROMORPHONE HYDROCHLORIDE 0.5 MG: 1 INJECTION, SOLUTION INTRAMUSCULAR; INTRAVENOUS; SUBCUTANEOUS at 10:41

## 2021-05-20 RX ADMIN — ONDANSETRON 4 MG: 2 INJECTION INTRAMUSCULAR; INTRAVENOUS at 10:11

## 2021-05-20 RX ADMIN — ONDANSETRON 4 MG: 2 INJECTION INTRAMUSCULAR; INTRAVENOUS at 13:22

## 2021-05-20 RX ADMIN — HYDROMORPHONE HYDROCHLORIDE 0.5 MG: 1 INJECTION, SOLUTION INTRAMUSCULAR; INTRAVENOUS; SUBCUTANEOUS at 10:21

## 2021-05-20 RX ADMIN — HYDROMORPHONE HYDROCHLORIDE 0.5 MG: 1 INJECTION, SOLUTION INTRAMUSCULAR; INTRAVENOUS; SUBCUTANEOUS at 10:31

## 2021-05-20 RX ADMIN — HYDROMORPHONE HYDROCHLORIDE 0.5 MG: 1 INJECTION, SOLUTION INTRAMUSCULAR; INTRAVENOUS; SUBCUTANEOUS at 10:11

## 2021-05-20 RX ADMIN — FENTANYL CITRATE 25 MCG: 50 INJECTION, SOLUTION INTRAMUSCULAR; INTRAVENOUS at 10:24

## 2021-05-20 RX ADMIN — LIDOCAINE HYDROCHLORIDE 40 MG: 20 INJECTION, SOLUTION EPIDURAL; INFILTRATION; INTRACAUDAL; PERINEURAL at 08:30

## 2021-05-20 RX ADMIN — GLYCOPYRROLATE 0.4 MG: 0.2 INJECTION INTRAMUSCULAR; INTRAVENOUS at 09:37

## 2021-05-20 RX ADMIN — MIDAZOLAM HYDROCHLORIDE 2 MG: 2 INJECTION, SOLUTION INTRAMUSCULAR; INTRAVENOUS at 08:21

## 2021-05-20 RX ADMIN — HYDROMORPHONE HYDROCHLORIDE 0.5 MG: 1 INJECTION, SOLUTION INTRAMUSCULAR; INTRAVENOUS; SUBCUTANEOUS at 14:00

## 2021-05-20 RX ADMIN — PROPOFOL 200 MG: 10 INJECTION, EMULSION INTRAVENOUS at 08:30

## 2021-05-20 NOTE — OP NOTES
[address] [place] [address] [facility], [address]                                 OPERATIVE REPORT    PATIENT:    Josh Vick  MRN:           [id number]   DATE:   2021  BILLIN  ROOM:       /  ATTENDING:   Jay Jay Little MD  DICTATING:   Jay Jay Little MD      PREOPERATIVE DIAGNOSIS:  Chronic calculus cholecystitis    POSTOPERATIVE DIAGNOSIS: samd    PROCEDURES PERFORMED: Robotic assisted laparoscopic cholecystectomy with firefly    SURGEON: Lincoln Christy MD    ASSISTANT: Alan Mishra SA    ANESTHESIA: General and local 0.25% Marcaine with epinephrine. FINDINGS: chronic calculus cholecystitis    SPECIMENS REMOVED: Gallbladder. ESTIMATED BLOOD LOSS: 15 mL    FLUIDS:  1000 mL    IMPLANTS:  None    OPERATIVE INDICATION: chronic calculus cholecystitis    DESCRIPTION OF PROCEDURE: The patient was identified in the holding area, where consent for laparoscopic, possible open, cholecystectomy was verified. Once in the operating room, the patient was placed supine and general anesthesia was induced. The abdomen was prepped and draped in sterile fashion using chlorhexidine solution and sterile drapes. Patient had 3 mg of indocyanine green injected in the holding area. The robotic and laparoscopic equipment were assembled and proper functioning was visualized prior to making the initial incision. The local anesthetic was infiltrated into the skin and deep dermal tissues at each incision prior to the incision being made. The initial trocar was placed at the umbilicus using the optiview technique. The 5 mm 0 degree scope was assembled to the 8 mm blunt tipped trocar. Safe entry into the peritoneal cavity was visualized. The insufflation was obtained using carbon dioxide gas to 15 mmHg. The 8 mm blunt tipped trocar was then placed in the left upper quadrant.   An 8 mm trocar in the right lower quadrant. A 5 mm trocar was placed in the right subcostal position. The patient was placed in reverse Trendelenburg with the right side up. The surgical cart was guided into position and the trocars were docked. The fundus of the gallbladder was identified and retracted anterior,  cephalad, and lateral.  Blunt dissection was employed to visualize the cystic duct infundibulum junction. Firefly was used visualize the cystic duct and common bile duct. The cystic duct was dissected free circumferentially and clipped and ligated in standard fashion. The cystic artery was identified and clipped and ligated in standard fashion. The gallbladder was removed from the gallbladder fossa using electrocautery for both hemostasis and dissection. Surgicel was placed into the gallbladder fossa to assist with hemostasis. A 4x8 gauze was inserted to help remove blood and bile from the field and then removed. The gallbladder was removed from the peritoneal cavity at the RLQ 8 mm trocar in an Endocatch. The RLQ 8 mm trocar site was closed using 0 Vicryl suture and a suture passer. The area of the clips was inspected and found to be free of  any bleeding or bile leak. The pneumoperitoneum was allowed to deflate. 4-0 Monocryl suture was used to close the incision sites in interrupted stitches. Mastisol, steristrips and bandaids were used to create dressings. The patient tolerated the procedure very well. DISPOSITION: She was extubated and stable upon transport to the recovery  room.       Cindy Alfaro MD, FACS

## 2021-05-20 NOTE — ANESTHESIA PREPROCEDURE EVALUATION
Relevant Problems   No relevant active problems       Anesthetic History   No history of anesthetic complications            Review of Systems / Medical History  Patient summary reviewed, nursing notes reviewed and pertinent labs reviewed    Pulmonary  Within defined limits                 Neuro/Psych   Within defined limits           Cardiovascular    Hypertension              Exercise tolerance: >4 METS     GI/Hepatic/Renal     GERD           Endo/Other        Arthritis     Other Findings              Physical Exam    Airway  Mallampati: II  TM Distance: 4 - 6 cm  Neck ROM: normal range of motion   Mouth opening: Normal     Cardiovascular  Regular rate and rhythm,  S1 and S2 normal,  no murmur, click, rub, or gallop  Rhythm: regular  Rate: normal         Dental  No notable dental hx       Pulmonary  Breath sounds clear to auscultation               Abdominal  GI exam deferred       Other Findings            Anesthetic Plan    ASA: 3  Anesthesia type: general          Induction: Intravenous  Anesthetic plan and risks discussed with: Patient

## 2021-05-20 NOTE — ANESTHESIA POSTPROCEDURE EVALUATION
Procedure(s):  ROBOTIC ASSISTED LAPAROSCOPIC CHOLECYSTECTOMY WITH FIREFLY. general    Anesthesia Post Evaluation      Multimodal analgesia: multimodal analgesia used between 6 hours prior to anesthesia start to PACU discharge  Patient location during evaluation: bedside  Patient participation: complete - patient participated  Level of consciousness: awake  Pain management: adequate  Airway patency: patent  Anesthetic complications: no  Cardiovascular status: stable  Respiratory status: acceptable  Hydration status: acceptable  Post anesthesia nausea and vomiting:  controlled  Final Post Anesthesia Temperature Assessment:  Normothermia (36.0-37.5 degrees C)      INITIAL Post-op Vital signs:   Vitals Value Taken Time   /85 05/20/21 1032   Temp 36.7 °C (98 °F) 05/20/21 0955   Pulse 74 05/20/21 1042   Resp 10 05/20/21 1042   SpO2 92 % 05/20/21 1042   Vitals shown include unvalidated device data.

## 2021-05-20 NOTE — DISCHARGE INSTRUCTIONS
[facility]  Roselyn Webster MD, FACS  General Surgery    Pt may remove the dressing and shower in two days. Allow soap and water to run over the incision. No driving or operating heavy machinery while on narcotic pain medications. Please apply an ice pack to the operative site for 30 minutes 3 times daily to help reduce pain and swelling and the need for narcotic pain medication. No strenuous activity or contact sports for two weeks. No lifting greater than 15 lbs for 2 weeks. Call MD for any redness, swelling, bleeding or pus at the incision. Also call for any nausea, vomiting, increased pain or pain uncontrolled by pain medicine. Low-Fat Diet for Gallbladder Disease: After Your Visit  Your Care Instructions  When you eat, the gallbladder releases bile, which helps you digest the fat in food. If you have an inflamed gallbladder, this may cause pain. A low-fat diet may give your gallbladder a rest so you can start to heal. Your doctor and dietitian can help you make an eating plan that does not irritate your digestive system. Always talk with your doctor or dietitian before you make changes in your diet. Follow-up care is a key part of your treatment and safety. Be sure to make and go to all appointments, and call your doctor if you are having problems. Its also a good idea to know your test results and keep a list of the medicines you take. How can you care for yourself at home? · Eat many small meals and snacks each day instead of three large meals. · Choose lean meats. ¨ Eat no more than 5 to 6½ ounces of meat a day. ¨ Cut off all fat you can see. ¨ Eat chicken and turkey without the skin. ¨ Many types of fish, such as salmon, lake trout, tuna, and herring, provide healthy omega-3 fat. But, avoid fish canned in oil, such as sardines in olive oil. ¨ Bake, broil, or grill meats, fowl, or fish instead of frying them in butter or fat.   · Drink or eat nonfat or low-fat milk, yogurt, cheese, or other milk products each day. ¨ Read the labels on cheeses, and choose those with less than 5 grams of fat an ounce. ¨ Try fat-free sour cream, cream cheese, or yogurt. ¨ Avoid cream soups and cream sauces on pasta. ¨ Eat low-fat ice cream, frozen yogurt, or sorbet. Avoid regular ice cream.  · Eat whole-grain cereals, breads, crackers, rice, or pasta. Avoid high-fat foods such as croissants, scones, biscuits, waffles, doughnuts, muffins, granola, and high-fat breads. · Flavor your foods with herbs and spices (such as basil, tarragon, or mint), fat-free sauces, or lemon juice instead of butter. You can also use butter substitutes, fat-free mayonnaise, or fat-free dressing. · Try applesauce, prune puree, or mashed bananas to replace some or all of the fat when you bake. · Limit fats and oils, such as butter, margarine, mayonnaise, and salad dressing, to no more than 1 tablespoon a meal.  · Avoid high-fat foods, such as:  ¨ Chocolate, whole milk, ice cream, processed cheese, and egg yolks. ¨ Fried or buttered foods. ¨ Ham, salami, and handley. ¨ Cinnamon rolls, cakes, pies, cookies, and other pastries. ¨ Prepared snack foods, such as potato chips, nut and granola bars, and mixed nuts. ¨ Coconut and avocado. · Learn how to read food labels for serving sizes and ingredients. Fast-food and convenience-food meals often have lots of fat. Where can you learn more? Go to Interesante.com.be  Enter K040 in the search box to learn more about \"Low-Fat Diet for Gallbladder Disease: After Your Visit. \"   © 9002-2425 Healthwise, Incorporated. Care instructions adapted under license by [facility] (which disclaims liability or warranty for this information).  This care instruction is for use with your licensed healthcare professional. If you have questions about a medical condition or this instruction, always ask your healthcare professional. [employer] disclaims any warranty or liability for your use of this information. Content Version: 4.7.078195; Last Revised: August 31, 2011  Patient Education        High-Fiber Diet: Care Instructions  Your Care Instructions     A high-fiber diet may help you relieve constipation and feel less bloated. Your doctor and dietitian will help you make a high-fiber eating plan based on your personal needs. The plan will include the things you like to eat. It will also make sure that you get 30 grams of fiber a day. Before you make changes to the way you eat, be sure to talk with your doctor or dietitian. Follow-up care is a key part of your treatment and safety. Be sure to make and go to all appointments, and call your doctor if you are having problems. It's also a good idea to know your test results and keep a list of the medicines you take. How can you care for yourself at home? · You can increase how much fiber you get if you eat more of certain foods. These foods include:  ? Whole-grain breads and cereals. ? Fruits, such as pears, apples, and peaches. Eat the skins, peels, and seeds, if you can.  ? Vegetables, such as broccoli, cabbage, spinach, carrots, asparagus, and squash. ? Starchy vegetables. These include potatoes with skins, kidney beans, and lima beans. · Take a fiber supplement every day if your doctor recommends it. Examples are Benefiber, Citrucel, FiberCon, and Metamucil. Ask your doctor how much to take. · Drink plenty of fluids. If you have kidney, heart, or liver disease and have to limit fluids, talk with your doctor before you increase the amount of fluids you drink. · Get some exercise every day. Exercise helps stool move through the colon. It also helps prevent constipation. · Keep a food diary. Try to notice and write down what foods cause gas, pain, or other symptoms. Then you can avoid these foods. Where can you learn more?   Go to http://www.gray.com/  Enter [id number] in the search box to learn more about \"High-Fiber Diet: Care Instructions. \"  Current as of: December 17, 2020               Content Version: 12.8  © 2006-2021 [employer]. Care instructions adapted under license by [employer] (which disclaims liability or warranty for this information). If you have questions about a medical condition or this instruction, always ask your healthcare professional. [address] any warranty or liability for your use of this information. Patient Education        Gallbladder Removal Surgery: What to Expect at Home  Your Recovery  After your surgery, you will likely feel weak and tired for several days after you return home. Your belly may be swollen. If you had laparoscopic surgery, you may also have pain in your shoulder for about 24 hours. You may have gas or need to burp a lot at first. A few people get diarrhea. The diarrhea usually goes away in 2 to 4 weeks, but it may last longer. How quickly you recover depends on whether you had a laparoscopic or open surgery. · For a laparoscopic surgery, most people can go back to work or their normal routine in 1 to 2 weeks. But it may take longer, depending on the type of work you do. · For an open surgery, it will probably take 4 to 6 weeks before you get back to your normal routine. This care sheet gives you a general idea about how long it will take for you to recover. However, each person recovers at a different pace. Follow the steps below to get better as quickly as possible. How can you care for yourself at home? Activity    · Rest when you feel tired. Getting enough sleep will help you recover.     · Try to walk each day. Start out by walking a little more than you did the day before. Gradually increase the amount you walk.  Walking boosts blood flow and helps prevent pneumonia and constipation.     · For about 2 to 4 weeks, avoid lifting anything that would make you strain. This may include a child, heavy grocery bags and milk containers, a heavy briefcase or backpack, cat litter or dog food bags, or a vacuum .     · Avoid strenuous activities, such as biking, jogging, weightlifting, and aerobic exercise, until your doctor says it is okay.     · You may shower 24 to 48 hours after surgery, if your doctor okays it. Pat the cut (incision) dry. Do not take a bath for the first 2 weeks, or until your doctor tells you it is okay.     · You may drive when you are no longer taking pain medicine and can quickly move your foot from the gas pedal to the brake. You must also be able to sit comfortably for a long period of time, even if you do not plan to go far. You might get caught in traffic.     · For a laparoscopic surgery, most people can go back to work or their normal routine in 1 to 2 weeks, but it may take longer. For an open surgery, it will probably take 4 to 6 weeks before you get back to your normal routine.     · Your doctor will tell you when you can have sex again. Diet    · Eat smaller meals more often instead of fewer larger meals. You can eat a normal diet, but avoid eating fatty foods for about 1 month. Fatty foods include hamburger, whole milk, cheese, and many snack foods. If your stomach is upset, try bland, low-fat foods like plain rice, broiled chicken, toast, and yogurt.     · Drink plenty of fluids (unless your doctor tells you not to).   · If you have diarrhea, try avoiding spicy foods, dairy products, fatty foods, and alcohol. You can also watch to see if specific foods cause it, and stop eating them. If the diarrhea continues for more than 2 weeks, talk to your doctor.     · You may notice that your bowel movements are not regular right after your surgery. This is common. Try to avoid constipation and straining with bowel movements. You may want to take a fiber supplement every day.  If you have not had a bowel movement after a couple of days, ask your doctor about taking a mild laxative. Medicines    · Your doctor will tell you if and when you can restart your medicines. He or she will also give you instructions about taking any new medicines.     · If you take aspirin or some other blood thinner, ask your doctor if and when to start taking it again. Make sure that you understand exactly what your doctor wants you to do.     · Take pain medicines exactly as directed. ? If the doctor gave you a prescription medicine for pain, take it as prescribed. ? If you are not taking a prescription pain medicine, take an over-the-counter medicine such as acetaminophen (Tylenol), ibuprofen (Advil, Motrin), or naproxen (Aleve). Read and follow all instructions on the label. ? Do not take two or more pain medicines at the same time unless the doctor told you to. Many pain medicines contain acetaminophen, which is Tylenol. Too much Tylenol can be harmful.     · If you think your pain medicine is making you sick to your stomach:  ? Take your medicine after meals (unless your doctor tells you not to). ? Ask your doctor for a different pain medicine.     · If your doctor prescribed antibiotics, take them as directed. Do not stop taking them just because you feel better. You need to take the full course of antibiotics. Ice    · To reduce swelling and pain, put ice or a cold pack on your belly for 10 to 20 minutes at a time. Do this every 1 to 2 hours. Put a thin cloth between the ice and your skin. Follow-up care is a key part of your treatment and safety. Be sure to make and go to all appointments, and call your doctor if you are having problems. It's also a good idea to know your test results and keep a list of the medicines you take. When should you call for help? Call 911 anytime you think you may need emergency care.  For example, call if:    · You passed out (lost consciousness).     · You are short of breath. .   Call your doctor now or seek immediate medical care if:    · You are sick to your stomach and cannot drink fluids.     · You have pain that does not get better when you take your pain medicine.     · You cannot pass stools or gas.     · You have signs of infection, such as:  ? Increased pain, swelling, warmth, or redness. ? Red streaks leading from the incision. ? Pus draining from the incision. ? A fever.     · Bright red blood has soaked through the bandage over your incision.     · You have loose stitches, or your incision comes open.     · You have signs of a blood clot in your leg (called a deep vein thrombosis), such as:  ? Pain in your calf, back of knee, thigh, or groin. ? Redness and swelling in your leg or groin. Watch closely for any changes in your health, and be sure to contact your doctor if you have any problems. Where can you learn more? Go to http://www.gray.com/  Enter F357 in the search box to learn more about \"Gallbladder Removal Surgery: What to Expect at Home. \"  Current as of: April 15, 2020               Content Version: 12.8  © 8180-4861 Healthwise, Incorporated. Care instructions adapted under license by [employer] (which disclaims liability or warranty for this information). If you have questions about a medical condition or this instruction, always ask your healthcare professional. [address] any warranty or liability for your use of this information.            .Patient armband removed and shredded

## 2021-05-20 NOTE — PERIOP NOTES
Uncontrolled nausea that has improved with phenergan IM. IV infusing. Patient would like to stay for about 1 hr.  was notified, and verbalized understanding. Will continue to monitor.

## 2021-05-20 NOTE — DISCHARGE SUMMARY
[facility]  Asif Dixon MD, FACS  General Surgery  Discharge Summary     Patient ID:  Apryl De La Torre  [id number]  64 y.o.  1965    Admit Date: 5/20/2021    Discharge Date: 5/20/2021    Admission Diagnoses: K80.20 GALLSTONES    Discharge Diagnoses:    Problem List as of 5/20/2021 Date Reviewed: 4/20/2021        Codes Class Noted - Resolved    GERD (gastroesophageal reflux disease) ICD-10-CM: K21.9  ICD-9-CM: 530.81  Unknown - Present        Hypertension ICD-10-CM: I10  ICD-9-CM: 401.9  Unknown - Present        Nausea ICD-10-CM: R11.0  ICD-9-CM: 787.02  8/5/2019 - Present        Epigastric abdominal pain ICD-10-CM: R10.13  ICD-9-CM: 789.06  8/5/2019 - Present        Other chronic pain ICD-10-CM: G89.29  ICD-9-CM: 338.29  8/5/2019 - Present        Family history of colon cancer ICD-10-CM: Z80.0  ICD-9-CM: V16.0  8/5/2019 - Present        DDD (degenerative disc disease), lumbar ICD-10-CM: M51.36  ICD-9-CM: 722.52  11/3/2017 - Present        DDD (degenerative disc disease), cervical ICD-10-CM: M50.30  ICD-9-CM: 722.4  11/3/2017 - Present        Essential hypertension ICD-10-CM: I10  ICD-9-CM: 401.9  11/3/2017 - Present        Migraine ICD-10-CM: B25.248  ICD-9-CM: 346.90  11/3/2017 - Present        RESOLVED: Need for hepatitis C screening test ICD-10-CM: Z11.59  ICD-9-CM: V73.89  8/5/2019 - 11/20/2020        RESOLVED: Screening for colon cancer ICD-10-CM: Z12.11  ICD-9-CM: V76.51  8/5/2019 - 9/19/2019               Admission Condition: Good    Discharge Condition: Good    Last Procedure: Procedure(s):  ROBOTIC ASSISTED [address] Course:   Normal hospital course for this procedure.     Consults: None    Significant Diagnostic Studies: None    Disposition: home    Patient Instructions:   Current Discharge Medication List      START taking these medications    Details   oxyCODONE IR (ROXICODONE) 5 mg immediate release tablet Take 1 Tablet by mouth every four (4) hours as needed for Pain for up to 3 days. Max Daily Amount: 30 mg.  Qty: 20 Tablet, Refills: 0    Associated Diagnoses: Postoperative pain      acetaminophen (TYLENOL) 325 mg tablet Take 2 Tablets by mouth every six (6) hours. Indications: pain  Qty: 56 Tablet, Refills: 1      docusate sodium (COLACE) 100 mg capsule Take 1 Capsule by mouth two (2) times a day for 90 days. Qty: 60 Capsule, Refills: 2      bisacodyL 5 mg tab Take 5 mg by mouth daily. Qty: 3 Tablet, Refills: 0         CONTINUE these medications which have NOT CHANGED    Details   ALPRAZolam (XANAX) 0.25 mg tablet take 1 to 2 tablets by mouth at bedtime for insomnia RELATED TO PAIN AND ANXIETY      atenoloL (TENORMIN) 25 mg tablet take 1 tablet by mouth once daily  Qty: 90 Tab, Refills: 1    Associated Diagnoses: Essential hypertension      ondansetron (ZOFRAN ODT) 4 mg disintegrating tablet Take 1 Tab by mouth every eight (8) hours as needed for Nausea. Qty: 30 Tab, Refills: 2    Associated Diagnoses: Nausea; Other migraine without status migrainosus, not intractable      rizatriptan (MAXALT-MLT) 10 mg disintegrating tablet One tab placed on tongue at headache onset. May repeat in 2 hours if needed. Max two tabs in 24 hours. Qty: 36 Tab, Refills: 3      traMADol (ULTRAM) 50 mg tablet Take 1 Tab by mouth.      pantoprazole (PROTONIX) 40 mg tablet Take 1 Tab by mouth two (2) times a day. Qty: 180 Tab, Refills: 5    Associated Diagnoses: Nausea; Epigastric abdominal pain      methocarbamol (ROBAXIN) 500 mg tablet Take  by mouth four (4) times daily. conjugated estrogens (PREMARIN) 0.9 mg tablet take 1 tablet by mouth once daily  Qty: 90 Tab, Refills: 3    Associated Diagnoses: Postmenopausal HRT (hormone replacement therapy)      lidocaine (LIDODERM) 5 % by TransDERmal route every twenty-four (24) hours. Apply patch to the affected area for 12 hours a day and remove for 12 hours a day.       !! zonisamide (ZONEGRAN) 50 mg capsule Take 50 mg qhs  Qty: 90 Cap, Refills: 3      !! zonisamide (ZONEGRAN) 25 mg capsule Take along with 50 mg tablet for total of 75 mg qhs  Qty: 90 Cap, Refills: 1      tiZANidine (ZANAFLEX) 2 mg capsule take 2 capsule by oral route  every 6 hours       ! ! - Potential duplicate medications found. Please discuss with provider. Activity: See surgical instructions  Diet: Low fat, Low cholesterol  Wound Care: As directed    Follow-up with Dr. Flower Porras in 2 weeks.   Follow-up tests/labs None    Signed:  Benoit Bailey MD  5/20/2021  7:34 AM

## 2021-05-20 NOTE — INTERVAL H&P NOTE
Update History & Physical 
 
The Patient's History and Physical of April 20, 2021 was reviewed with the patient and I examined the patient. There was no change. The surgical site was confirmed by the patient and me. Plan:  The risk, benefits, expected outcome, and alternative to the recommended procedure have been discussed with the patient. Patient understands and wants to proceed with the procedure.  
 
Electronically signed by Raphael Barrett MD on 5/20/2021 at 7:28 AM

## 2021-05-24 ENCOUNTER — VIRTUAL VISIT (OUTPATIENT)
Dept: FAMILY MEDICINE CLINIC | Age: 56
End: 2021-05-24
Payer: OTHER GOVERNMENT

## 2021-05-24 DIAGNOSIS — K21.9 GASTROESOPHAGEAL REFLUX DISEASE, UNSPECIFIED WHETHER ESOPHAGITIS PRESENT: ICD-10-CM

## 2021-05-24 DIAGNOSIS — R11.0 NAUSEA AFTER ANESTHESIA, INITIAL ENCOUNTER: ICD-10-CM

## 2021-05-24 DIAGNOSIS — I10 ESSENTIAL HYPERTENSION: Primary | ICD-10-CM

## 2021-05-24 DIAGNOSIS — T88.59XA NAUSEA AFTER ANESTHESIA, INITIAL ENCOUNTER: ICD-10-CM

## 2021-05-24 DIAGNOSIS — Z90.49 S/P LAPAROSCOPIC CHOLECYSTECTOMY: ICD-10-CM

## 2021-05-24 PROCEDURE — 99214 OFFICE O/P EST MOD 30 MIN: CPT | Performed by: FAMILY MEDICINE

## 2021-05-24 RX ORDER — METHOCARBAMOL 750 MG/1
TABLET, FILM COATED ORAL
COMMUNITY
Start: 2021-03-22

## 2021-05-24 RX ORDER — ATENOLOL 25 MG/1
TABLET ORAL
Qty: 90 TABLET | Refills: 1 | Status: SHIPPED | OUTPATIENT
Start: 2021-05-24 | End: 2021-11-16

## 2021-05-24 RX ORDER — ONDANSETRON 4 MG/1
TABLET, FILM COATED ORAL
COMMUNITY
Start: 2021-05-20 | End: 2022-01-21 | Stop reason: SDUPTHER

## 2021-05-24 RX ORDER — PROMETHAZINE HYDROCHLORIDE 25 MG/1
25 TABLET ORAL
Qty: 20 TABLET | Refills: 0 | Status: SHIPPED | OUTPATIENT
Start: 2021-05-24 | End: 2021-06-09

## 2021-05-24 NOTE — PROGRESS NOTES
Ant Hyatt is a 64 y.o. female who was seen by synchronous (real-time) audio-video technology on 5/24/2021 for Hypertension and GERD        Assessment & Plan:   Diagnoses and all orders for this visit:    1. Essential hypertension  -     atenoloL (TENORMIN) 25 mg tablet; take 1 tablet by mouth once daily  -     METABOLIC PANEL, COMPREHENSIVE; Future  -     LIPID PANEL; Future  Stable, cont pres tx plan. 2. S/P laparoscopic cholecystectomy  Care as per gen surg. 3. Gastroesophageal reflux disease, unspecified whether esophagitis present  Stable, cont pres tx plan. 4. Nausea after anesthesia, initial encounter  -     promethazine (PHENERGAN) 25 mg tablet; Take 1 Tablet by mouth every six (6) hours as needed for Nausea. The complexity of medical decision making for this visit is moderate   Follow-up and Dispositions    · Return in about 3 months (around 8/24/2021) for high blood pressure. 712  Subjective:   Patient contacted via doxy. me. Pt had a lap neida last week with Dr. Nemesio Alexander. She had some nausea and vomiting related to her anesthesia. Pt is advancing her diet at home. Home bp readings have been normotensive.       gerd is well controlled. Pt has had her covid vaccinations. Prior to Admission medications    Medication Sig Start Date End Date Taking? Authorizing Provider   promethazine (PHENERGAN) 25 mg tablet Take 1 Tablet by mouth every six (6) hours as needed for Nausea. 5/24/21  Yes Roma Encinas MD   atenoloL (TENORMIN) 25 mg tablet take 1 tablet by mouth once daily 5/24/21  Yes Xochitl Dong MD   acetaminophen (TYLENOL) 325 mg tablet Take 2 Tablets by mouth every six (6) hours. Indications: pain 5/20/21  Yes Anthony Lopes MD   docusate sodium (COLACE) 100 mg capsule Take 1 Capsule by mouth two (2) times a day for 90 days. 5/20/21 8/18/21 Yes Anthony Lopes MD   bisacodyL 5 mg tab Take 5 mg by mouth daily.  5/20/21  Yes Nemesio Alexander Cat Archuleta MD   ALPRAZolam Melvin Lavender) 0.25 mg tablet take 1 to 2 tablets by mouth at bedtime for insomnia RELATED TO PAIN AND ANXIETY 12/11/20  Yes Provider, Historical   ondansetron (ZOFRAN ODT) 4 mg disintegrating tablet Take 1 Tab by mouth every eight (8) hours as needed for Nausea. 11/20/20  Yes Dangelo Madrid MD   rizatriptan (MAXALT-MLT) 10 mg disintegrating tablet One tab placed on tongue at headache onset. May repeat in 2 hours if needed. Max two tabs in 24 hours. 8/3/20  Yes Charisse Back NP   traMADol (ULTRAM) 50 mg tablet Take 1 Tab by mouth. Yes Provider, Historical   pantoprazole (PROTONIX) 40 mg tablet Take 1 Tab by mouth two (2) times a day. 8/5/19  Yes Leann Bearden NP   methocarbamol (ROBAXIN) 500 mg tablet Take  by mouth four (4) times daily. Yes Provider, Historical   conjugated estrogens (PREMARIN) 0.9 mg tablet take 1 tablet by mouth once daily 9/24/18  Yes Paul Dong MD   lidocaine (LIDODERM) 5 % by TransDERmal route every twenty-four (24) hours. Apply patch to the affected area for 12 hours a day and remove for 12 hours a day. Yes Provider, Historical   ondansetron hcl (ZOFRAN) 4 mg tablet take 1 tablet by mouth every 6 hours if needed nausea  Patient not taking: Reported on 5/24/2021 5/20/21   Provider, Historical   methocarbamoL (ROBAXIN) 750 mg tablet take 1 tablet by mouth twice a day if needed for muscle spasm 3/22/21   Provider, Historical   oxyCODONE IR (ROXICODONE) 5 mg immediate release tablet Take 1 Tablet by mouth every four (4) hours as needed for Pain for up to 3 days.  Max Daily Amount: 30 mg. 5/20/21 5/23/21  Margaux Gaitan MD   zonisamide (ZONEGRAN) 50 mg capsule Take 50 mg qhs  Patient not taking: Reported on 5/24/2021 7/31/20   Kayleigh Walker NP   zonisamide (ZONEGRAN) 25 mg capsule Take along with 50 mg tablet for total of 75 mg qhs  Patient not taking: Reported on 5/24/2021 7/31/20   Kayleigh Walker, JONAH   tiZANidine (Wade Cabrera) 2 mg capsule take 2 capsule by oral route  every 6 hours  Patient not taking: Reported on 5/24/2021    Provider, Historical     Patient Active Problem List   Diagnosis Code    DDD (degenerative disc disease), lumbar M51.36    DDD (degenerative disc disease), cervical M50.30    Essential hypertension I10    Migraine G43.909    Nausea R11.0    Epigastric abdominal pain R10.13    Other chronic pain G89.29    Family history of colon cancer Z80.0    GERD (gastroesophageal reflux disease) K21.9    Hypertension I10     Current Outpatient Medications   Medication Sig Dispense Refill    promethazine (PHENERGAN) 25 mg tablet Take 1 Tablet by mouth every six (6) hours as needed for Nausea. 20 Tablet 0    atenoloL (TENORMIN) 25 mg tablet take 1 tablet by mouth once daily 90 Tablet 1    acetaminophen (TYLENOL) 325 mg tablet Take 2 Tablets by mouth every six (6) hours. Indications: pain 56 Tablet 1    docusate sodium (COLACE) 100 mg capsule Take 1 Capsule by mouth two (2) times a day for 90 days. 60 Capsule 2    bisacodyL 5 mg tab Take 5 mg by mouth daily. 3 Tablet 0    ALPRAZolam (XANAX) 0.25 mg tablet take 1 to 2 tablets by mouth at bedtime for insomnia RELATED TO PAIN AND ANXIETY      ondansetron (ZOFRAN ODT) 4 mg disintegrating tablet Take 1 Tab by mouth every eight (8) hours as needed for Nausea. 30 Tab 2    rizatriptan (MAXALT-MLT) 10 mg disintegrating tablet One tab placed on tongue at headache onset. May repeat in 2 hours if needed. Max two tabs in 24 hours. 36 Tab 3    traMADol (ULTRAM) 50 mg tablet Take 1 Tab by mouth.  pantoprazole (PROTONIX) 40 mg tablet Take 1 Tab by mouth two (2) times a day. 180 Tab 5    methocarbamol (ROBAXIN) 500 mg tablet Take  by mouth four (4) times daily.  conjugated estrogens (PREMARIN) 0.9 mg tablet take 1 tablet by mouth once daily 90 Tab 3    lidocaine (LIDODERM) 5 % by TransDERmal route every twenty-four (24) hours.  Apply patch to the affected area for 12 hours a day and remove for 12 hours a day.       ondansetron hcl (ZOFRAN) 4 mg tablet take 1 tablet by mouth every 6 hours if needed nausea (Patient not taking: Reported on 2021)      methocarbamoL (ROBAXIN) 750 mg tablet take 1 tablet by mouth twice a day if needed for muscle spasm      zonisamide (ZONEGRAN) 50 mg capsule Take 50 mg qhs (Patient not taking: Reported on 2021) 90 Cap 3    zonisamide (ZONEGRAN) 25 mg capsule Take along with 50 mg tablet for total of 75 mg qhs (Patient not taking: Reported on 2021) 90 Cap 1    tiZANidine (ZANAFLEX) 2 mg capsule take 2 capsule by oral route  every 6 hours (Patient not taking: Reported on 2021)       Allergies   Allergen Reactions    Nsaids (Non-Steroidal Anti-Inflammatory Drug) Other (comments)     Raises blood pressure    Prednisone Other (comments)     muscle/joint pain     Past Medical History:   Diagnosis Date    Back problem     Chronic pain     Constipation     DDD (degenerative disc disease), cervical     DDD (degenerative disc disease), lumbar     GERD (gastroesophageal reflux disease)     Hypertension     Joint pain     Migraine 11/3/2017     Past Surgical History:   Procedure Laterality Date    HX COLONOSCOPY      HX HYSTERECTOMY      HX TOTAL VAGINAL HYSTERECTOMY      HX UROLOGICAL  2013    bladder lift    HX WISDOM TEETH EXTRACTION       Family History   Problem Relation Age of Onset    Hypertension Mother     Cancer Father         Prostate    Diabetes Father     Hypertension Father     Breast Cancer Maternal Aunt     Breast Cancer Paternal Aunt      Social History     Tobacco Use    Smoking status: Former Smoker     Years: 2.00     Types: Cigarettes     Quit date:      Years since quittin.4    Smokeless tobacco: Never Used   Substance Use Topics    Alcohol use: Yes     Comment: oc       ROS    Objective:     Patient-Reported Vitals 2020   Patient-Reported Weight 179lb   Patient-Reported Height 5'5.5      General: alert, cooperative, no distress   Mental  status: normal mood, behavior, speech, dress, motor activity, and thought processes, able to follow commands   HENT: NCAT   Neck: no visualized mass   Resp: no respiratory distress   Neuro: no gross deficits   Skin: no discoloration or lesions of concern on visible areas   Psychiatric: normal affect, consistent with stated mood, no evidence of hallucinations     Additional exam findings: We discussed the expected course, resolution and complications of the diagnosis(es) in detail. Medication risks, benefits, costs, interactions, and alternatives were discussed as indicated. I advised her to contact the office if her condition worsens, changes or fails to improve as anticipated. She expressed understanding with the diagnosis(es) and plan. Cheryl Maldonado, was evaluated through a synchronous (real-time) audio-video encounter. The patient (or guardian if applicable) is aware that this is a billable service. Verbal consent to proceed has been obtained within the past 12 months. The visit was conducted pursuant to the emergency declaration under the [address] [place] [address] authority and the [employer] General Act. Patient identification was verified, and a caregiver was present when appropriate. The patient was located in a state where the provider was credentialed to provide care.     Alwin Aase, MD

## 2021-05-24 NOTE — PROGRESS NOTES
Michelene Bloch presents today for   Chief Complaint   Patient presents with    Hypertension    GERD       Virtual/telephone visit    Depression Screening:  3 most recent PHQ Screens 5/24/2021   Little interest or pleasure in doing things Not at all   Feeling down, depressed, irritable, or hopeless Not at all   Total Score PHQ 2 0       Learning Assessment:  Learning Assessment 2/22/2021   PRIMARY LEARNER Patient   HIGHEST LEVEL OF EDUCATION - PRIMARY LEARNER  4 YEARS Joseluis PRIMARY LEARNER NONE   CO-LEARNER CAREGIVER No   PRIMARY LANGUAGE ENGLISH   LEARNER PREFERENCE PRIMARY DEMONSTRATION     -     -   ANSWERED BY Patient   RELATIONSHIP SELF       Travel Screening:   Travel Screening     Question   Response    In the last month, have you been in contact with someone who was confirmed or suspected to have Coronavirus / COVID-19? No / Unsure    Have you had a COVID-19 viral test in the last 14 days? Yes - Negative result (Tested within the last 2 weeks due to surgery.)    Do you have any of the following new or worsening symptoms? None of these    Have you traveled internationally or domestically in the last month? No      Travel History   Travel since 04/24/21     No documented travel since 04/24/21          Health Maintenance reviewed and discussed and ordered per Provider. Health Maintenance Due   Topic Date Due    COVID-19 Vaccine (1) Never done    DTaP/Tdap/Td series (1 - Tdap) Never done    Shingrix Vaccine Age 50> (1 of 2) Never done    Colorectal Cancer Screening Combo  Never done   . Coordination of Care:  1. Have you been to the ER, urgent care clinic since your last visit? Hospitalized since your last visit? Pt had Gallbladder removed 5/20/21    2. Have you seen or consulted any other health care providers outside of the [address] [place] [address] since your last visit? Include any pap smears or colon screening.  No

## 2021-05-28 RX ORDER — METOCLOPRAMIDE 10 MG/1
10 TABLET ORAL
Qty: 40 TABLET | Refills: 0 | Status: SHIPPED | OUTPATIENT
Start: 2021-05-28 | End: 2021-06-07

## 2021-06-09 ENCOUNTER — OFFICE VISIT (OUTPATIENT)
Dept: SURGERY | Age: 56
End: 2021-06-09
Payer: OTHER GOVERNMENT

## 2021-06-09 VITALS
TEMPERATURE: 98.4 F | DIASTOLIC BLOOD PRESSURE: 76 MMHG | BODY MASS INDEX: 29.32 KG/M2 | HEIGHT: 65 IN | HEART RATE: 76 BPM | SYSTOLIC BLOOD PRESSURE: 138 MMHG | RESPIRATION RATE: 18 BRPM | WEIGHT: 176 LBS | OXYGEN SATURATION: 99 %

## 2021-06-09 DIAGNOSIS — Z09 POSTOPERATIVE EXAMINATION: Primary | ICD-10-CM

## 2021-06-09 PROCEDURE — 99024 POSTOP FOLLOW-UP VISIT: CPT | Performed by: SURGERY

## 2021-06-09 RX ORDER — METOCLOPRAMIDE 10 MG/1
10 TABLET ORAL
COMMUNITY
End: 2022-01-31

## 2021-06-09 NOTE — PROGRESS NOTES
Chief Complaint   Patient presents with    Post OP Follow Up     Robotic assisted laparoscopic cholecystectomy with firefly   1. Have you been to the ER, urgent care clinic since your last visit? Hospitalized since your last visit? No    2. Have you seen or consulted any other health care providers outside of the [address] [place] [address] since your last visit? Include any pap smears or colon screening.  No

## 2021-06-28 NOTE — PROGRESS NOTES
[facility]  General Surgery    Name: Brielle Ngo MRN: [id number]   : 1965 Hospital: [facility]   Date: 2021 Admission Date: No admission date for patient encounter. Subjective:  Patient presents today without complaints. She is 2 weeks out from robot-assisted laparoscopic cholecystectomy with firefly. She states that she is eating small meals. She had nausea the first week which has resolved along with her pain. We discussed her pathology report. Her bowels are moving well. Objective:  Vitals:    21 1302   BP: 138/76   Pulse: 76   Resp: 18   Temp: 98.4 °F (36.9 °C)   SpO2: 99%   Weight: 79.8 kg (176 lb)   Height: 5' 5\" (1.651 m)       Physical Exam:    General: Awake and alert, oriented x4, no apparent distress   Abdomen: abdomen is soft with minimal right upper quadrant incisional tenderness. Incision(s) are C/D/I. No masses, organomegaly or guarding    Current Medications:  Current Outpatient Medications   Medication Sig Dispense Refill    metoclopramide HCl (Reglan) 10 mg tablet Take 10 mg by mouth Before breakfast, lunch, dinner and at bedtime.  ondansetron hcl (ZOFRAN) 4 mg tablet take 1 tablet by mouth every 6 hours if needed nausea      methocarbamoL (ROBAXIN) 750 mg tablet take 1 tablet by mouth twice a day if needed for muscle spasm      atenoloL (TENORMIN) 25 mg tablet take 1 tablet by mouth once daily 90 Tablet 1    acetaminophen (TYLENOL) 325 mg tablet Take 2 Tablets by mouth every six (6) hours. Indications: pain 56 Tablet 1    rizatriptan (MAXALT-MLT) 10 mg disintegrating tablet One tab placed on tongue at headache onset. May repeat in 2 hours if needed. Max two tabs in 24 hours. 36 Tab 3    traMADol (ULTRAM) 50 mg tablet Take 1 Tab by mouth.  pantoprazole (PROTONIX) 40 mg tablet Take 1 Tab by mouth two (2) times a day.  180 Tab 5    conjugated estrogens (PREMARIN) 0.9 mg tablet take 1 tablet by mouth once daily 90 Tab 3    lidocaine (LIDODERM) 5 % by TransDERmal route every twenty-four (24) hours. Apply patch to the affected area for 12 hours a day and remove for 12 hours a day.  ALPRAZolam (XANAX) 0.25 mg tablet take 1 to 2 tablets by mouth at bedtime for insomnia RELATED TO PAIN AND ANXIETY (Patient not taking: Reported on 6/9/2021)      zonisamide (ZONEGRAN) 50 mg capsule Take 50 mg qhs (Patient not taking: Reported on 5/24/2021) 90 Cap 3    zonisamide (ZONEGRAN) 25 mg capsule Take along with 50 mg tablet for total of 75 mg qhs (Patient not taking: Reported on 6/9/2021) 90 Cap 1    tiZANidine (ZANAFLEX) 2 mg capsule take 2 capsule by oral route  every 6 hours (Patient not taking: Reported on 5/24/2021)      methocarbamol (ROBAXIN) 500 mg tablet Take  by mouth four (4) times daily. (Patient not taking: Reported on 6/9/2021)         Chart and notes reviewed. Data reviewed. I have evaluated and examined the patient. IMPRESSION:   · Patient doing well 2 weeks out from [facility]. PLAN:/DISCUSION:   · Continue low-fat high-fiber diet for overall health.   · Follow-up as needed        Roselyn Webster MD

## 2021-08-03 PROBLEM — I10 ESSENTIAL HYPERTENSION: Status: RESOLVED | Noted: 2017-11-03 | Resolved: 2021-08-03

## 2021-08-20 ENCOUNTER — TRANSCRIBE ORDER (OUTPATIENT)
Dept: SCHEDULING | Age: 56
End: 2021-08-20

## 2021-08-20 DIAGNOSIS — Z12.31 SCREENING MAMMOGRAM FOR HIGH-RISK PATIENT: Primary | ICD-10-CM

## 2021-09-17 ENCOUNTER — TELEPHONE (OUTPATIENT)
Dept: FAMILY MEDICINE CLINIC | Age: 56
End: 2021-09-17

## 2021-09-17 ENCOUNTER — OFFICE VISIT (OUTPATIENT)
Dept: FAMILY MEDICINE CLINIC | Age: 56
End: 2021-09-17
Payer: OTHER GOVERNMENT

## 2021-09-17 VITALS
HEIGHT: 65 IN | HEART RATE: 79 BPM | RESPIRATION RATE: 18 BRPM | SYSTOLIC BLOOD PRESSURE: 132 MMHG | BODY MASS INDEX: 31.16 KG/M2 | DIASTOLIC BLOOD PRESSURE: 83 MMHG | WEIGHT: 187 LBS | TEMPERATURE: 97.8 F | OXYGEN SATURATION: 99 %

## 2021-09-17 DIAGNOSIS — I10 ESSENTIAL HYPERTENSION: Primary | ICD-10-CM

## 2021-09-17 DIAGNOSIS — Z23 NEEDS FLU SHOT: ICD-10-CM

## 2021-09-17 DIAGNOSIS — M13.0 POLYARTICULAR ARTHRITIS: ICD-10-CM

## 2021-09-17 DIAGNOSIS — I83.813 VARICOSE VEINS OF BOTH LOWER EXTREMITIES WITH PAIN: ICD-10-CM

## 2021-09-17 DIAGNOSIS — Z12.11 SCREEN FOR COLON CANCER: ICD-10-CM

## 2021-09-17 DIAGNOSIS — L60.9 NAIL DISORDER, UNSPECIFIED: ICD-10-CM

## 2021-09-17 PROCEDURE — 90686 IIV4 VACC NO PRSV 0.5 ML IM: CPT | Performed by: FAMILY MEDICINE

## 2021-09-17 PROCEDURE — 99214 OFFICE O/P EST MOD 30 MIN: CPT | Performed by: FAMILY MEDICINE

## 2021-09-17 PROCEDURE — 90471 IMMUNIZATION ADMIN: CPT | Performed by: FAMILY MEDICINE

## 2021-09-17 NOTE — PROGRESS NOTES
Siobhan Arizmendi 1965 female who presents for routine immunizations. Patient denies any symptoms , reactions or allergies that would exclude them from being immunized today. Risks and adverse reactions were discussed and the VIS was given to them. All questions were addressed. Order placed for Flulaval,  per Verbal Order from Dr. Saranya Gold with read back. Patient was observed for 15 min post injection. There were no reactions observed.     [facility]

## 2021-09-17 NOTE — PROGRESS NOTES
Chief Complaint   Patient presents with    Follow-up    Hypertension         HPI    Clementina Godoy is a 64 y.o. female presenting today for  3 months  follow up of htn. No recent labs. New concerns today: pt is worried that she has a fungal infection of her nails. She would like to have this addressed. Pt reports hx of varicose veins. She has a procedure for one in the past.  She now has another that is painful. She would like to see a vasc surg. Pt cont to have back pain. She is seeing her pain management doctor soon for another douglas. Pt reports that she has arthritis in her hands that is bothering her. Pt would like a flu shot today. Pt needs a referral for colo. Review of Systems   Constitutional: Negative. HENT: Negative. Respiratory: Negative. Cardiovascular:        Painful varicose veins   Skin:        Nails  from the nail bed   All other systems reviewed and are negative. Physical Exam  Vitals and nursing note reviewed. Constitutional:       Appearance: Normal appearance. She is not ill-appearing. HENT:      Head: Normocephalic and atraumatic. Right Ear: External ear normal.      Left Ear: External ear normal.      Nose: Nose normal.      Mouth/Throat:      Mouth: Mucous membranes are moist.   Eyes:      Extraocular Movements: Extraocular movements intact. Conjunctiva/sclera: Conjunctivae normal.   Cardiovascular:      Rate and Rhythm: Normal rate and regular rhythm. Heart sounds: No murmur heard. No friction rub. No gallop. Comments: BLE varicosities  Pulmonary:      Effort: Pulmonary effort is normal.      Breath sounds: Normal breath sounds. No wheezing, rhonchi or rales. Musculoskeletal:         General: Normal range of motion. Cervical back: Normal range of motion. Right lower leg: No edema. Left lower leg: No edema. Skin:     General: Skin is warm and dry.       Comments: Fingernail separation from nail bed   Neurological:      Mental Status: She is alert and oriented to person, place, and time. Coordination: Coordination normal.   Psychiatric:         Mood and Affect: Mood normal.         Behavior: Behavior normal.         Thought Content: Thought content normal.         Judgment: Judgment normal.         Diagnoses and all orders for this visit:    1. Essential hypertension  Stable, cont pres tx plan. 2. Nail disorder, unspecified  -     REFERRAL TO DERMATOLOGY    3. Polyarticular arthritis  -     REFERRAL TO RHEUMATOLOGY    4. Screen for colon cancer  -     REFERRAL TO GASTROENTEROLOGY    5. Varicose veins of both lower extremities with pain  -     REFERRAL TO VASCULAR SURGERY    6. Needs flu shot  -     INFLUENZA VIRUS VAC QUAD,SPLIT,PRESV FREE SYRINGE IM      Follow-up and Dispositions    · Return in about 3 months (around 12/17/2021) for high blood pressure, f/u specialist evals.

## 2021-09-17 NOTE — TELEPHONE ENCOUNTER
Pt would like to see    [facility]   [address] [place] [address], [address]   [phone].

## 2021-09-17 NOTE — PROGRESS NOTES
David Lee presents today for   Chief Complaint   Patient presents with    Follow-up    Hypertension       Is someone accompanying this pt? no    Is the patient using any DME equipment during OV? no    Depression Screening:  3 most recent PHQ Screens 9/17/2021   Little interest or pleasure in doing things Not at all   Feeling down, depressed, irritable, or hopeless Not at all   Total Score PHQ 2 0       Learning Assessment:  Learning Assessment 2/22/2021   PRIMARY LEARNER Patient   HIGHEST LEVEL OF EDUCATION - PRIMARY LEARNER  [facility] PRIMARY LEARNER NONE   CO-LEARNER CAREGIVER No   PRIMARY LANGUAGE ENGLISH   LEARNER PREFERENCE PRIMARY DEMONSTRATION     -     -   ANSWERED BY Patient   RELATIONSHIP SELF       Fall Risk  Fall Risk Assessment, last 12 mths 2/22/2021   Able to walk? Yes   Fall in past 12 months? 0   Do you feel unsteady? 0   Are you worried about falling 0       ADL  No flowsheet data found. Travel Screening:    Travel Screening     Question   Response    In the last month, have you been in contact with someone who was confirmed or suspected to have Coronavirus / COVID-19? No / Unsure    Have you had a COVID-19 viral test in the last 14 days? No    Do you have any of the following new or worsening symptoms? Have you traveled internationally or domestically in the last month? No      Travel History   Travel since 08/17/21     No documented travel since 08/17/21          Health Maintenance reviewed and discussed and ordered per Provider. Health Maintenance Due   Topic Date Due    COVID-19 Vaccine (1) Never done    DTaP/Tdap/Td series (1 - Tdap) Never done    Colorectal Cancer Screening Combo  Never done    Shingrix Vaccine Age 50> (1 of 2) Never done    Flu Vaccine (1) 09/01/2021   . Coordination of Care:  1. Have you been to the ER, urgent care clinic since your last visit? Hospitalized since your last visit? no    2.  Have you seen or consulted any other health care providers outside of the [address] [place] [address] since your last visit? Include any pap smears or colon screening.  no

## 2021-10-29 ENCOUNTER — HOSPITAL ENCOUNTER (OUTPATIENT)
Dept: MAMMOGRAPHY | Age: 56
Discharge: HOME | End: 2021-10-29
Attending: NURSE PRACTITIONER
Payer: OTHER GOVERNMENT

## 2021-10-29 DIAGNOSIS — Z12.31 SCREENING MAMMOGRAM FOR HIGH-RISK PATIENT: ICD-10-CM

## 2021-10-29 PROCEDURE — 77063 BREAST TOMOSYNTHESIS BI: CPT

## 2021-11-01 RX ORDER — RIZATRIPTAN BENZOATE 10 MG/1
TABLET, ORALLY DISINTEGRATING ORAL
Qty: 36 TABLET | Refills: 3 | OUTPATIENT
Start: 2021-11-01

## 2021-11-09 RX ORDER — RIZATRIPTAN BENZOATE 10 MG/1
TABLET, ORALLY DISINTEGRATING ORAL
Qty: 36 TABLET | Refills: 3 | OUTPATIENT
Start: 2021-11-09

## 2021-11-15 DIAGNOSIS — I10 ESSENTIAL HYPERTENSION: ICD-10-CM

## 2021-11-16 RX ORDER — ATENOLOL 25 MG/1
TABLET ORAL
Qty: 90 TABLET | Refills: 3 | Status: SHIPPED | OUTPATIENT
Start: 2021-11-16

## 2022-01-31 ENCOUNTER — VIRTUAL VISIT (OUTPATIENT)
Dept: NEUROLOGY | Age: 57
End: 2022-01-31
Payer: OTHER GOVERNMENT

## 2022-01-31 DIAGNOSIS — G43.009 MIGRAINE WITHOUT AURA AND WITHOUT STATUS MIGRAINOSUS, NOT INTRACTABLE: Primary | ICD-10-CM

## 2022-01-31 PROCEDURE — 99214 OFFICE O/P EST MOD 30 MIN: CPT | Performed by: NURSE PRACTITIONER

## 2022-01-31 RX ORDER — ELETRIPTAN HYDROBROMIDE 40 MG/1
TABLET, FILM COATED ORAL
Qty: 9 TABLET | Refills: 0 | Status: SHIPPED | OUTPATIENT
Start: 2022-01-31 | End: 2022-05-24 | Stop reason: SDUPTHER

## 2022-01-31 NOTE — PROGRESS NOTES
Ernesta Blizzard is a 64 y.o. female who was seen by synchronous (real-time) audio-video technology on 1/31/2022 for Migraine        Assessment & Plan:   Diagnoses and all orders for this visit:    1. Migraine without aura and without status migrainosus, not intractable    Other orders  -     eletriptan (RELPAX) 40 mg tablet; Take one tablet at migraine headache onset, may repeat in 2 hours if necessary. Max two tabs in 24 hours. Patient returns for migraine follow up. Last seen in 7/2020. Had gallbladder removed in the meantime. No longer on Zonegran. Now having 3 migraine headaches day a month. Denies focal deficits. Maxalt with relief from migraine, but is incomplete and headaches could return the next day. Will try Relpax for abortive headache therapy. New Rx sent to local pharmacy. She will update me regarding sending new script to [facility]. Maintain 3 month follow up. All questions addressed and patient is agreeable with plan of care. I spent at least 30 minutes on this visit with this established patient. [phone]  Subjective: This is a 64year old female who presents for follow up migraines. Last seen in July of 2020. In the interim had her gallbladder removed. Said headaches were worse on Zonegran. She stopped the medicine for a week and her headaches got better. Denies daily headaches. She will occasionally have sporadic headaches that last several days. Having 3 days a month. The first thing that happens is nausea and will have light sensitivity. Having a pressure, throbbing pain. Occasionally will see flashing lights. The headache can last all day. Maxalt with bring down intensity but headache will return the next day. Denies focal deficits. Requesting refills. No other concerns at this time. Prior to Admission medications    Medication Sig Start Date End Date Taking?  Authorizing Provider   eletriptan (RELPAX) 40 mg tablet Take one tablet at migraine headache onset, may repeat in 2 hours if necessary. Max two tabs in 24 hours. 1/31/22  Yes Nicola Back NP   ondansetron (ZOFRAN ODT) 4 mg disintegrating tablet PLACE 1 TABLET ON THE TONGUE EVERY 8 HOURS AS NEEDED FOR NAUSEA 1/21/22  Yes Anne-Marie Dong MD   atenoloL (TENORMIN) 25 mg tablet TAKE 1 TABLET DAILY 11/16/21  Yes Amarilis Schmitz MD   methocarbamoL (ROBAXIN) 750 mg tablet take 1 tablet by mouth twice a day if needed for muscle spasm 3/22/21  Yes Provider, Historical   acetaminophen (TYLENOL) 325 mg tablet Take 2 Tablets by mouth every six (6) hours. Indications: pain 5/20/21  Yes Rosenda Pineda MD   ALPRAZolam Con Rouleau) 0.25 mg tablet take 1 to 2 tablets by mouth at bedtime for insomnia RELATED TO PAIN AND ANXIETY 12/11/20  Yes Provider, Historical   traMADol (ULTRAM) 50 mg tablet Take 1 Tab by mouth. Yes Provider, Historical   pantoprazole (PROTONIX) 40 mg tablet Take 1 Tab by mouth two (2) times a day. 8/5/19  Yes Jyotsna Head NP   conjugated estrogens (PREMARIN) 0.9 mg tablet take 1 tablet by mouth once daily 9/24/18  Yes Angela Dong MD   lidocaine (LIDODERM) 5 % by TransDERmal route every twenty-four (24) hours. Apply patch to the affected area for 12 hours a day and remove for 12 hours a day. Yes Provider, Historical   metoclopramide HCl (Reglan) 10 mg tablet Take 10 mg by mouth Before breakfast, lunch, dinner and at bedtime. Patient not taking: Reported on 1/31/2022 1/31/22  Provider, Historical   rizatriptan (MAXALT-MLT) 10 mg disintegrating tablet One tab placed on tongue at headache onset. May repeat in 2 hours if needed. Max two tabs in 24 hours.  8/3/20 1/31/22  Vernon Joaquin NP   zonisamide (ZONEGRAN) 50 mg capsule Take 50 mg qhs  Patient not taking: Reported on 5/24/2021 7/31/20 1/31/22  Vernon Joaquin NP   zonisamide (ZONEGRAN) 25 mg capsule Take along with 50 mg tablet for total of 75 mg qhs  Patient not taking: Reported on 6/9/2021 7/31/20 1/31/22  Reginaldo Stover K, NP   tiZANidine (ZANAFLEX) 2 mg capsule take 2 capsule by oral route  every 6 hours  Patient not taking: Reported on 9/17/2021 1/31/22  Provider, Historical   methocarbamol (ROBAXIN) 500 mg tablet Take  by mouth four (4) times daily. Patient not taking: Reported on 6/9/2021 1/31/22  Provider, Historical     Patient Active Problem List   Diagnosis Code    DDD (degenerative disc disease), lumbar M51.36    DDD (degenerative disc disease), cervical M50.30    Migraine G43.909    Nausea R11.0    Epigastric abdominal pain R10.13    Other chronic pain G89.29    Family history of colon cancer Z80.0    GERD (gastroesophageal reflux disease) K21.9    Hypertension I10     Patient Active Problem List    Diagnosis Date Noted    GERD (gastroesophageal reflux disease)     Hypertension     Nausea 08/05/2019    Epigastric abdominal pain 08/05/2019    Other chronic pain 08/05/2019    Family history of colon cancer 08/05/2019    DDD (degenerative disc disease), lumbar 11/03/2017    DDD (degenerative disc disease), cervical 11/03/2017    Migraine 11/03/2017     Current Outpatient Medications   Medication Sig Dispense Refill    eletriptan (RELPAX) 40 mg tablet Take one tablet at migraine headache onset, may repeat in 2 hours if necessary. Max two tabs in 24 hours. 9 Tablet 0    ondansetron (ZOFRAN ODT) 4 mg disintegrating tablet PLACE 1 TABLET ON THE TONGUE EVERY 8 HOURS AS NEEDED FOR NAUSEA 30 Tablet 1    atenoloL (TENORMIN) 25 mg tablet TAKE 1 TABLET DAILY 90 Tablet 3    methocarbamoL (ROBAXIN) 750 mg tablet take 1 tablet by mouth twice a day if needed for muscle spasm      acetaminophen (TYLENOL) 325 mg tablet Take 2 Tablets by mouth every six (6) hours. Indications: pain 56 Tablet 1    ALPRAZolam (XANAX) 0.25 mg tablet take 1 to 2 tablets by mouth at bedtime for insomnia RELATED TO PAIN AND ANXIETY      traMADol (ULTRAM) 50 mg tablet Take 1 Tab by mouth.       pantoprazole (PROTONIX) 40 mg tablet Take 1 Tab by mouth two (2) times a day. 180 Tab 5    conjugated estrogens (PREMARIN) 0.9 mg tablet take 1 tablet by mouth once daily 90 Tab 3    lidocaine (LIDODERM) 5 % by TransDERmal route every twenty-four (24) hours. Apply patch to the affected area for 12 hours a day and remove for 12 hours a day.        Allergies   Allergen Reactions    Nsaids (Non-Steroidal Anti-Inflammatory Drug) Other (comments)     Raises blood pressure    Prednisone Other (comments)     muscle/joint pain     Past Medical History:   Diagnosis Date    Back problem     Chronic pain     Constipation     DDD (degenerative disc disease), cervical     DDD (degenerative disc disease), lumbar     GERD (gastroesophageal reflux disease)     Hypertension     Joint pain     Migraine 11/3/2017    Raynaud's disease      Past Surgical History:   Procedure Laterality Date    HX CHOLECYSTECTOMY      HX COLONOSCOPY      HX HYSTERECTOMY      HX TOTAL VAGINAL HYSTERECTOMY      HX UROLOGICAL  2013    bladder lift    HX WISDOM TEETH EXTRACTION       Family History   Problem Relation Age of Onset    Hypertension Mother     Cancer Father         Prostate    Diabetes Father     Hypertension Father     Breast Cancer Maternal Aunt     Breast Cancer Paternal Aunt      Social History     Tobacco Use    Smoking status: Former Smoker     Years: 2.00     Types: Cigarettes     Quit date:      Years since quittin.    Smokeless tobacco: Never Used   Substance Use Topics    Alcohol use: Yes     Comment: occasional       Review of Systems  GENERAL: Denies fever or fatigue  CARDIAC: No CP or SOB  PULMONARY: No cough of SOB  MUSCULOSKELETAL: No new joint pain  NEURO: SEE HPI      Objective:     Patient-Reported Vitals 2022   Patient-Reported Weight 179lb   Patient-Reported Height -      General: alert, cooperative, no distress   Mental  status: normal mood, behavior, speech, dress, motor activity, and thought processes, able to follow commands   HENT: NCAT   Neck: no visualized mass   Resp: no respiratory distress   Neuro: no gross deficits   Skin: no discoloration or lesions of concern on visible areas   Psychiatric: normal affect, consistent with stated mood, no evidence of hallucinations     Additional exam findings: This is a very pleasant 64year old female. She is alert and in NAD. Full fund of knowledge. Speech is clear. No facial asymmetry. No signs of ataxia or incoordination. We discussed the expected course, resolution and complications of the diagnosis(es) in detail. Medication risks, benefits, costs, interactions, and alternatives were discussed as indicated. I advised her to contact the office if her condition worsens, changes or fails to improve as anticipated. She expressed understanding with the diagnosis(es) and plan. Sophie Pearl, was evaluated through a synchronous (real-time) audio-video encounter. The patient (or guardian if applicable) is aware that this is a billable service, which includes applicable co-pays. Verbal consent to proceed has been obtained. The visit was conducted pursuant to the emergency declaration under the [address] [place] [address], [address] [place] [address] authority and the [employer] and [employer] General Act. Patient identification was verified, and a caregiver was present when appropriate. The patient was located at home in a state where the provider was licensed to provide care.     Gerhardt Finders, NP

## 2022-01-31 NOTE — PROGRESS NOTES
Marguerite Mejia presents today for   Chief Complaint   Patient presents with    Migraine       Is someone accompanying this pt? Virtual visit [phone]    Is the patient using any DME equipment during OV? no    Depression Screening:  3 most recent PHQ Screens 9/17/2021   Little interest or pleasure in doing things Not at all   Feeling down, depressed, irritable, or hopeless Not at all   Total Score PHQ 2 0       Learning Assessment:  Learning Assessment 2/22/2021   PRIMARY LEARNER Patient   HIGHEST LEVEL OF EDUCATION - PRIMARY LEARNER  4 YEARS [facility] PRIMARY LEARNER NONE   CO-LEARNER CAREGIVER No   PRIMARY LANGUAGE ENGLISH   LEARNER PREFERENCE PRIMARY DEMONSTRATION     -     -   ANSWERED BY Patient   RELATIONSHIP SELF       Abuse Screening:  Abuse Screening Questionnaire 5/24/2021   Do you ever feel afraid of your partner? N   Are you in a relationship with someone who physically or mentally threatens you? N   Is it safe for you to go home? Y       Fall Risk  Fall Risk Assessment, last 12 mths 2/22/2021   Able to walk? Yes   Fall in past 12 months? 0   Do you feel unsteady? 0   Are you worried about falling 0         Coordination of Care:  1. Have you been to the ER, urgent care clinic since your last visit? Hospitalized since your last visit? Yes, [facility]    2. Have you seen or consulted any other health care providers outside of the [address] [place] [address] since your last visit? Include any pap smears or colon screening. celsa Sweeney.

## 2022-03-07 ENCOUNTER — TELEPHONE (OUTPATIENT)
Dept: FAMILY MEDICINE CLINIC | Age: 57
End: 2022-03-07

## 2022-03-07 NOTE — TELEPHONE ENCOUNTER
----- Message from Favio Sinclair sent at 3/1/2022  3:03 PM EST -----  Subject: Referral Request    QUESTIONS   Reason for referral request? Pain Mgmt   Has the physician seen you for this condition before? Yes  Select a date? 2021-09-17  Select the Provider the patient wants to be referred to, if known (PCP or   Specialist)? Outside Physician - Arevalo Books, MD   Preferred Specialist (if applicable)? Do you already have an appointment scheduled? No  Additional Information for Provider? Needs new [facility] Care referral. Has seen   him in the past, MD changed practices. [address]   [place] [address]. [address] [phone] Fx [phone] Please call   patient when ready. ---------------------------------------------------------------------------  --------------  [email or web address] INFO  What is the best way for the office to contact you? OK to leave message on   voicemail,Do not leave any message, patient will call back for answer  Preferred Call Back Phone Number?  [phone]

## 2022-03-07 NOTE — TELEPHONE ENCOUNTER
----- Message from Anselmo Jansen sent at 3/1/2022  3:03 PM EST -----  Subject: Referral Request    QUESTIONS   Reason for referral request? Pain Mgmt   Has the physician seen you for this condition before? Yes  Select a date? 2021-09-17  Select the Provider the patient wants to be referred to, if known (PCP or   Specialist)? Outside Physician - Marcello Marie MD   Preferred Specialist (if applicable)? Do you already have an appointment scheduled? No  Additional Information for Provider? Needs new [facility] Care referral. Has seen   him in the past, MD changed practices. [address] [place] [address]  [phone]  [phone] Please call   patient when ready. ---------------------------------------------------------------------------  --------------  Abimael Fearing INFO  What is the best way for the office to contact you? OK to leave message on   voicemail,Do not leave any message, patient will call back for answer  Preferred Call Back Phone Number?  [phone]

## 2022-03-18 PROBLEM — G43.909 MIGRAINE: Status: ACTIVE | Noted: 2017-11-03

## 2022-03-19 PROBLEM — Z80.0 FAMILY HISTORY OF COLON CANCER: Status: ACTIVE | Noted: 2019-08-05

## 2022-03-19 PROBLEM — M51.369 DDD (DEGENERATIVE DISC DISEASE), LUMBAR: Status: ACTIVE | Noted: 2017-11-03

## 2022-03-19 PROBLEM — M51.36 DDD (DEGENERATIVE DISC DISEASE), LUMBAR: Status: ACTIVE | Noted: 2017-11-03

## 2022-03-19 PROBLEM — R10.13 EPIGASTRIC ABDOMINAL PAIN: Status: ACTIVE | Noted: 2019-08-05

## 2022-03-19 PROBLEM — M50.30 DDD (DEGENERATIVE DISC DISEASE), CERVICAL: Status: ACTIVE | Noted: 2017-11-03

## 2022-03-20 PROBLEM — R11.0 NAUSEA: Status: ACTIVE | Noted: 2019-08-05

## 2022-03-20 PROBLEM — G89.29 OTHER CHRONIC PAIN: Status: ACTIVE | Noted: 2019-08-05

## 2022-05-25 RX ORDER — ELETRIPTAN HYDROBROMIDE 40 MG/1
TABLET, FILM COATED ORAL
Qty: 9 TABLET | Refills: 0 | Status: SHIPPED | OUTPATIENT
Start: 2022-05-25

## 2022-08-01 ENCOUNTER — TELEPHONE (OUTPATIENT)
Dept: FAMILY MEDICINE CLINIC | Age: 57
End: 2022-08-01

## 2022-08-01 NOTE — TELEPHONE ENCOUNTER
Patient calling back in reference to a referral for Dr. Inocente Quan. ..requesting a call back from you. Please advise.

## 2022-08-30 ENCOUNTER — TELEPHONE (OUTPATIENT)
Dept: FAMILY MEDICINE CLINIC | Age: 57
End: 2022-08-30

## 2022-08-30 DIAGNOSIS — M13.0 POLYARTICULAR ARTHRITIS: Primary | ICD-10-CM

## 2023-01-19 ENCOUNTER — TELEPHONE (OUTPATIENT)
Dept: FAMILY MEDICINE CLINIC | Age: 58
End: 2023-01-19

## 2023-01-19 NOTE — TELEPHONE ENCOUNTER
Tested positive yesterday for covid  Has fever,cough,sore throat,congestion,headache,and diarrhea  Pt is requesting med be sent to pharmacy  Please advise

## 2023-01-20 NOTE — TELEPHONE ENCOUNTER
Patient called in regards to testing positive for Covid yesterday and would like to know if a prescription can be called in for her? Please advise.

## 2023-02-06 ENCOUNTER — VIRTUAL VISIT (OUTPATIENT)
Dept: FAMILY MEDICINE CLINIC | Age: 58
End: 2023-02-06
Payer: OTHER GOVERNMENT

## 2023-02-06 DIAGNOSIS — I83.90 VARICOSE VEINS OF LOWER EXTREMITY, UNSPECIFIED LATERALITY, UNSPECIFIED WHETHER COMPLICATED: ICD-10-CM

## 2023-02-06 DIAGNOSIS — I10 ESSENTIAL HYPERTENSION: Primary | ICD-10-CM

## 2023-02-06 DIAGNOSIS — G43.809 OTHER MIGRAINE WITHOUT STATUS MIGRAINOSUS, NOT INTRACTABLE: ICD-10-CM

## 2023-02-06 DIAGNOSIS — M13.0 POLYARTICULAR ARTHRITIS: ICD-10-CM

## 2023-02-06 DIAGNOSIS — J01.00 ACUTE NON-RECURRENT MAXILLARY SINUSITIS: ICD-10-CM

## 2023-02-06 DIAGNOSIS — I73.00 RAYNAUD'S DISEASE WITHOUT GANGRENE: ICD-10-CM

## 2023-02-06 DIAGNOSIS — U07.1 COVID-19: ICD-10-CM

## 2023-02-06 PROCEDURE — 99214 OFFICE O/P EST MOD 30 MIN: CPT | Performed by: FAMILY MEDICINE

## 2023-02-06 RX ORDER — ESTRADIOL 0.07 MG/D
PATCH, EXTENDED RELEASE TRANSDERMAL
COMMUNITY
Start: 2023-01-23

## 2023-02-06 RX ORDER — ELETRIPTAN HYDROBROMIDE 40 MG/1
TABLET, FILM COATED ORAL
Qty: 27 TABLET | Refills: 1 | Status: SHIPPED | OUTPATIENT
Start: 2023-02-06

## 2023-02-06 RX ORDER — ATENOLOL 25 MG/1
TABLET ORAL
Qty: 90 TABLET | Refills: 1 | Status: SHIPPED | OUTPATIENT
Start: 2023-02-06

## 2023-02-06 RX ORDER — AMOXICILLIN AND CLAVULANATE POTASSIUM 875; 125 MG/1; MG/1
1 TABLET, FILM COATED ORAL 2 TIMES DAILY
Qty: 20 TABLET | Refills: 0 | Status: SHIPPED | OUTPATIENT
Start: 2023-02-06 | End: 2023-02-16

## 2023-02-06 RX ORDER — PREGABALIN 50 MG/1
50 CAPSULE ORAL 3 TIMES DAILY
COMMUNITY
Start: 2022-07-15

## 2023-02-06 NOTE — PROGRESS NOTES
Starla Sterling is a 62 y.o. female who was seen by synchronous (real-time) audio-video technology on 2/6/2023 for Hypertension, Medication Refill, and Other (Pt had covid 3 weeks ago but is still having symptoms pt needs a referral for a new neurologist )        Assessment & Plan:   Diagnoses and all orders for this visit:    1. Essential hypertension  -     METABOLIC PANEL, COMPREHENSIVE; Future  -     LIPID PANEL; Future  -     atenoloL (TENORMIN) 25 mg tablet; TAKE 1 TABLET DAILY  Recommend routine home blood pressure monitoring. 2. COVID-19  Resolved    3. Acute non-recurrent maxillary sinusitis  -     amoxicillin-clavulanate (AUGMENTIN) 875-125 mg per tablet; Take 1 Tablet by mouth two (2) times a day for 10 days. 4. Other migraine without status migrainosus, not intractable  -     REFERRAL TO NEUROLOGY  -     eletriptan (RELPAX) 40 mg tablet; Take one tablet at migraine headache onset, may repeat in 2 hours if necessary. Max two tabs in 24 hours. 5. Varicose veins of lower extremity, unspecified laterality, unspecified whether complicated  Care as per vascular surgery    6. Polyarticular arthritis  7. Raynaud's disease without gangrene  Care as per rheumatology    The complexity of medical decision making for this visit is moderate   Follow-up and Dispositions    Return in about 6 months (around 8/6/2023) for specialist eval, HTN. Return sooner if needed. Contact this office if sinusitis symptoms do not improve. Subjective:   Patient contacted via [facility] virtual visit. Pt reports that she has recently had covid, along with her entire office. She cont to have sinus congestion, ear pain, pnd, hoarseness. She has pain in the R upper teeth as well as maxillary sinus pressure. Pt requests neuro referral.  In the past, she was seen for migraine solis by JONAH Back. She does also need a refill of her relpax. Pt has been on a hormone patch with her gyn.   This has been very helpful. Pt has recently had a procedure for varicose veins. She will cont to f/up with vasc surg closely about this. Pt established care with rheum. Pt is following regularly with her pain doctor and is doing well with lyrica. Prior to Admission medications    Medication Sig Start Date End Date Taking? Authorizing Provider   Maricel 0.075 mg/24 hr  1/23/23  Yes Provider, Historical   pregabalin (LYRICA) 50 mg capsule Take 50 mg by mouth three (3) times daily. 7/15/22  Yes Provider, Historical   amoxicillin-clavulanate (AUGMENTIN) 875-125 mg per tablet Take 1 Tablet by mouth two (2) times a day for 10 days. 2/6/23 2/16/23 Yes Tyson Zavala MD   eletriptan (RELPAX) 40 mg tablet Take one tablet at migraine headache onset, may repeat in 2 hours if necessary. Max two tabs in 24 hours. 2/6/23  Yes Tyson Zavala MD   atenoloL (TENORMIN) 25 mg tablet TAKE 1 TABLET DAILY 2/6/23  Yes David Dong MD   ondansetron (ZOFRAN ODT) 4 mg disintegrating tablet PLACE 1 TABLET ON THE TONGUE EVERY 8 HOURS AS NEEDED FOR NAUSEA 1/21/22  Yes Tyson Zavala MD   methocarbamoL (ROBAXIN) 750 mg tablet take 1 tablet by mouth twice a day if needed for muscle spasm 3/22/21  Yes Provider, Historical   acetaminophen (TYLENOL) 325 mg tablet Take 2 Tablets by mouth every six (6) hours. Indications: pain 5/20/21  Yes Edith London MD   ALPRAZolam Irene Schillings) 0.25 mg tablet take 1 to 2 tablets by mouth at bedtime for insomnia RELATED TO PAIN AND ANXIETY 12/11/20  Yes Provider, Historical   traMADol (ULTRAM) 50 mg tablet Take 1 Tab by mouth. Yes Provider, Historical   pantoprazole (PROTONIX) 40 mg tablet Take 1 Tab by mouth two (2) times a day. 8/5/19  Yes Vira Minor NP   lidocaine (LIDODERM) 5 % by TransDERmal route every twenty-four (24) hours. Apply patch to the affected area for 12 hours a day and remove for 12 hours a day.    Yes Provider, Historical   conjugated estrogens (PREMARIN) 0.9 mg tablet take 1 tablet by mouth once daily  Patient not taking: Reported on 2/6/2023 9/24/18   Elige Spurling, MD     Patient Active Problem List   Diagnosis Code    DDD (degenerative disc disease), lumbar M51.36    DDD (degenerative disc disease), cervical M50.30    Migraine G43.909    Nausea R11.0    Epigastric abdominal pain R10.13    Other chronic pain G89.29    Family history of colon cancer Z80.0    GERD (gastroesophageal reflux disease) K21.9    Hypertension I10     Current Outpatient Medications   Medication Sig Dispense Refill    Maricel 0.075 mg/24 hr       pregabalin (LYRICA) 50 mg capsule Take 50 mg by mouth three (3) times daily. amoxicillin-clavulanate (AUGMENTIN) 875-125 mg per tablet Take 1 Tablet by mouth two (2) times a day for 10 days. 20 Tablet 0    eletriptan (RELPAX) 40 mg tablet Take one tablet at migraine headache onset, may repeat in 2 hours if necessary. Max two tabs in 24 hours. 27 Tablet 1    atenoloL (TENORMIN) 25 mg tablet TAKE 1 TABLET DAILY 90 Tablet 1    ondansetron (ZOFRAN ODT) 4 mg disintegrating tablet PLACE 1 TABLET ON THE TONGUE EVERY 8 HOURS AS NEEDED FOR NAUSEA 30 Tablet 1    methocarbamoL (ROBAXIN) 750 mg tablet take 1 tablet by mouth twice a day if needed for muscle spasm      acetaminophen (TYLENOL) 325 mg tablet Take 2 Tablets by mouth every six (6) hours. Indications: pain 56 Tablet 1    ALPRAZolam (XANAX) 0.25 mg tablet take 1 to 2 tablets by mouth at bedtime for insomnia RELATED TO PAIN AND ANXIETY      traMADol (ULTRAM) 50 mg tablet Take 1 Tab by mouth.      pantoprazole (PROTONIX) 40 mg tablet Take 1 Tab by mouth two (2) times a day. 180 Tab 5    lidocaine (LIDODERM) 5 % by TransDERmal route every twenty-four (24) hours. Apply patch to the affected area for 12 hours a day and remove for 12 hours a day.       conjugated estrogens (PREMARIN) 0.9 mg tablet take 1 tablet by mouth once daily (Patient not taking: Reported on 2/6/2023) 90 Tab 3     Allergies   Allergen Reactions Nsaids (Non-Steroidal Anti-Inflammatory Drug) Other (comments)     Raises blood pressure    Prednisone Other (comments)     muscle/joint pain     Past Medical History:   Diagnosis Date    Back problem     Chronic pain     Constipation     COVID 2023    DDD (degenerative disc disease), cervical     DDD (degenerative disc disease), lumbar     GERD (gastroesophageal reflux disease)     Hypertension     Joint pain     Migraine 2017    Raynaud's disease      Past Surgical History:   Procedure Laterality Date    HX CHOLECYSTECTOMY      HX COLONOSCOPY      HX HYSTERECTOMY      HX TOTAL VAGINAL HYSTERECTOMY      HX UROLOGICAL  2013    bladder lift    HX WISDOM TEETH EXTRACTION       Family History   Problem Relation Age of Onset    Hypertension Mother     Cancer Father         Prostate    Diabetes Father     Hypertension Father     Breast Cancer Maternal Aunt     Breast Cancer Paternal Aunt      Social History     Tobacco Use    Smoking status: Former     Years: 2.00     Types: Cigarettes     Quit date:      Years since quittin.1    Smokeless tobacco: Never   Substance Use Topics    Alcohol use: Yes     Comment: occasional       Review of Systems   Constitutional: Negative. Negative for chills and fever. HENT:  Positive for congestion, ear pain and sinus pain. Respiratory: Negative. Cardiovascular: Negative. All other systems reviewed and are negative.     Objective:     Patient-Reported Vitals 2022   Patient-Reported Weight 179lb   Patient-Reported Height -      General: alert, cooperative, no distress   Mental  status: normal mood, behavior, speech, dress, motor activity, and thought processes, able to follow commands   HENT: NCAT   Neck: no visualized mass   Resp: no respiratory distress   Neuro: no gross deficits   Skin: no discoloration or lesions of concern on visible areas   Psychiatric: normal affect, consistent with stated mood, no evidence of hallucinations     Additional exam findings: None      We discussed the expected course, resolution and complications of the diagnosis(es) in detail. Medication risks, benefits, costs, interactions, and alternatives were discussed as indicated. I advised her to contact the office if her condition worsens, changes or fails to improve as anticipated. She expressed understanding with the diagnosis(es) and plan. Jaylen Velasquez, was evaluated through a synchronous (real-time) audio-video encounter. The patient (or guardian if applicable) is aware that this is a billable service, which includes applicable co-pays. This Virtual Visit was conducted with patient's (and/or legal guardian's) consent. The visit was conducted pursuant to the emergency declaration under the [address] [place] [address] authority and the [employer] and [email or web address] General Act. Patient identification was verified, and a caregiver was present when appropriate. The patient was located at: [place] [id number]  The provider was located at:  Facility (Appt Department): [address] [place] [address] [facility]        Chalino Mai MD

## 2023-02-06 NOTE — PROGRESS NOTES
Bob Cisneros presents today for   Chief Complaint   Patient presents with    Hypertension    Medication Refill    Other     Pt had covid 3 weeks ago but is still having symptoms pt needs a referral for a new neurologist          Depression Screening:  3 most recent PHQ Screens 9/17/2021   Little interest or pleasure in doing things Not at all   Feeling down, depressed, irritable, or hopeless Not at all   Total Score PHQ 2 0       Learning Assessment:  Learning Assessment 2/22/2021   PRIMARY LEARNER Patient   HIGHEST LEVEL OF EDUCATION - PRIMARY LEARNER  4 YEARS [address] CAREGIVER No   PRIMARY LANGUAGE ENGLISH   LEARNER PREFERENCE PRIMARY DEMONSTRATION     -     -   ANSWERED BY Patient   RELATIONSHIP SELF       Abuse Screening:  Abuse Screening Questionnaire 5/24/2021   Do you ever feel afraid of your partner? N   Are you in a relationship with someone who physically or mentally threatens you? N   Is it safe for you to go home? Y       Generalized Anxiety  No flowsheet data found. Health Maintenance Due   Topic Date Due    DTaP/Tdap/Td series (1 - Tdap) Never done    Shingles Vaccine (1 of 2) Never done    COVID-19 Vaccine (3 - Booster for Pfizer series) 05/24/2021    Flu Vaccine (1) 08/01/2022    Depression Screen  09/17/2022   . Health Maintenance reviewed and discussed and ordered per Provider. VACCINES DUE   SCREENINGS DUE       Bob Cisneros is updated on all HM    1. \"Have you been to the ER, urgent care clinic since your last visit? Hospitalized since your last visit? \" No    2. \"Have you seen or consulted any other health care providers outside of the [address] [place] [address] since your last visit? \" No     3. For patients aged 39-70: Has the patient had a colonoscopy / FIT/ Cologuard? Yes - Care Gap present. Most recent result on file     If the patient is female:    4.  For patients aged 41-77: Has the patient had a mammogram within the past 2 years? No    5. For patients aged 21-65: Has the patient had a pap smear? Yes - Care Gap present.  Most recent result on file

## 2023-02-14 DIAGNOSIS — G43.809 OTHER MIGRAINE WITHOUT STATUS MIGRAINOSUS, NOT INTRACTABLE: Primary | ICD-10-CM

## 2023-05-01 ENCOUNTER — TELEPHONE (OUTPATIENT)
Facility: CLINIC | Age: 58
End: 2023-05-01

## 2023-05-01 DIAGNOSIS — I10 ESSENTIAL (PRIMARY) HYPERTENSION: Primary | ICD-10-CM

## 2023-05-01 NOTE — TELEPHONE ENCOUNTER
Patient will need labs entered for her next appointment. They did not transfer over when we switched to the new system.  Please advise

## 2023-05-09 ENCOUNTER — HOSPITAL ENCOUNTER (OUTPATIENT)
Facility: HOSPITAL | Age: 58
Discharge: HOME | End: 2023-05-12
Payer: OTHER GOVERNMENT

## 2023-05-09 LAB
ALBUMIN SERPL-MCNC: 3.6 G/DL (ref 3.4–5)
ALBUMIN/GLOB SERPL: 1.1 (ref 0.8–1.7)
ALP SERPL-CCNC: 60 U/L (ref 45–117)
ALT SERPL-CCNC: 19 U/L (ref 13–56)
ANION GAP SERPL CALC-SCNC: 3 MMOL/L (ref 3–18)
AST SERPL-CCNC: 14 U/L (ref 10–38)
BILIRUB SERPL-MCNC: 0.5 MG/DL (ref 0.2–1)
BUN SERPL-MCNC: 11 MG/DL (ref 7–18)
BUN/CREAT SERPL: 13 (ref 12–20)
CALCIUM SERPL-MCNC: 9 MG/DL (ref 8.5–10.1)
CHLORIDE SERPL-SCNC: 108 MMOL/L (ref 100–111)
CHOLEST SERPL-MCNC: 167 MG/DL
CO2 SERPL-SCNC: 29 MMOL/L (ref 21–32)
CREAT SERPL-MCNC: 0.84 MG/DL (ref 0.6–1.3)
GLOBULIN SER CALC-MCNC: 3.2 G/DL (ref 2–4)
GLUCOSE SERPL-MCNC: 101 MG/DL (ref 74–99)
HDLC SERPL-MCNC: 57 MG/DL (ref 40–60)
HDLC SERPL: 2.9 (ref 0–5)
LDLC SERPL CALC-MCNC: 93.2 MG/DL (ref 0–100)
LIPID PANEL: NORMAL
POTASSIUM SERPL-SCNC: 4.7 MMOL/L (ref 3.5–5.5)
PROT SERPL-MCNC: 6.8 G/DL (ref 6.4–8.2)
SODIUM SERPL-SCNC: 140 MMOL/L (ref 136–145)
TRIGL SERPL-MCNC: 84 MG/DL
VLDLC SERPL CALC-MCNC: 16.8 MG/DL

## 2023-05-09 PROCEDURE — 80061 LIPID PANEL: CPT

## 2023-05-09 PROCEDURE — 80053 COMPREHEN METABOLIC PANEL: CPT

## 2023-05-09 PROCEDURE — 36415 COLL VENOUS BLD VENIPUNCTURE: CPT

## 2023-05-19 RX ORDER — PREGABALIN 50 MG/1
50 CAPSULE ORAL 3 TIMES DAILY
COMMUNITY
Start: 2022-07-15

## 2023-05-19 RX ORDER — ESTRADIOL 0.07 MG/D
FILM, EXTENDED RELEASE TRANSDERMAL
COMMUNITY
Start: 2023-01-23

## 2023-07-12 RX ORDER — ELETRIPTAN HYDROBROMIDE 40 MG/1
TABLET, FILM COATED ORAL
Qty: 4 TABLET | Refills: 0 | Status: SHIPPED | OUTPATIENT
Start: 2023-07-12

## 2023-07-17 RX ORDER — ATENOLOL 25 MG/1
TABLET ORAL
Qty: 90 TABLET | Refills: 3 | Status: SHIPPED | OUTPATIENT
Start: 2023-07-17

## 2023-08-25 ENCOUNTER — HOSPITAL ENCOUNTER (OUTPATIENT)
Facility: HOSPITAL | Age: 58
Discharge: HOME | End: 2023-08-25
Payer: OTHER GOVERNMENT

## 2023-08-25 VITALS — BODY MASS INDEX: 31.16 KG/M2 | HEIGHT: 65 IN | WEIGHT: 187 LBS

## 2023-08-25 DIAGNOSIS — Z12.31 VISIT FOR SCREENING MAMMOGRAM: ICD-10-CM

## 2023-08-25 PROCEDURE — 77063 BREAST TOMOSYNTHESIS BI: CPT

## 2023-11-03 ENCOUNTER — OFFICE VISIT (OUTPATIENT)
Age: 58
End: 2023-11-03
Payer: OTHER GOVERNMENT

## 2023-11-03 VITALS
BODY MASS INDEX: 31.32 KG/M2 | HEIGHT: 65 IN | HEART RATE: 69 BPM | DIASTOLIC BLOOD PRESSURE: 80 MMHG | OXYGEN SATURATION: 98 % | SYSTOLIC BLOOD PRESSURE: 140 MMHG | RESPIRATION RATE: 16 BRPM | WEIGHT: 188 LBS

## 2023-11-03 DIAGNOSIS — I10 HYPERTENSION, UNSPECIFIED TYPE: ICD-10-CM

## 2023-11-03 DIAGNOSIS — G43.109 MIGRAINE WITH AURA AND WITHOUT STATUS MIGRAINOSUS, NOT INTRACTABLE: Primary | ICD-10-CM

## 2023-11-03 PROCEDURE — 3077F SYST BP >= 140 MM HG: CPT | Performed by: NURSE PRACTITIONER

## 2023-11-03 PROCEDURE — 3079F DIAST BP 80-89 MM HG: CPT | Performed by: NURSE PRACTITIONER

## 2023-11-03 PROCEDURE — 99213 OFFICE O/P EST LOW 20 MIN: CPT | Performed by: NURSE PRACTITIONER

## 2023-11-03 RX ORDER — ETANERCEPT 50 MG/ML
SOLUTION SUBCUTANEOUS
COMMUNITY
Start: 2023-10-03

## 2023-11-03 RX ORDER — ONDANSETRON 4 MG/1
TABLET, ORALLY DISINTEGRATING ORAL
Qty: 20 TABLET | Refills: 3 | Status: SHIPPED | OUTPATIENT
Start: 2023-11-03

## 2023-11-03 RX ORDER — RIMEGEPANT SULFATE 75 MG/75MG
75 TABLET, ORALLY DISINTEGRATING ORAL AS NEEDED
Qty: 8 TABLET | Refills: 5 | Status: SHIPPED | OUTPATIENT
Start: 2023-11-03

## 2023-11-03 RX ORDER — ATENOLOL 50 MG/1
50 TABLET ORAL DAILY
Qty: 30 TABLET | Refills: 5 | Status: SHIPPED | OUTPATIENT
Start: 2023-11-03

## 2023-11-03 RX ORDER — ELETRIPTAN HYDROBROMIDE 40 MG/1
TABLET, FILM COATED ORAL
Qty: 12 TABLET | Refills: 5 | Status: SHIPPED | OUTPATIENT
Start: 2023-11-03

## 2023-11-03 NOTE — PROGRESS NOTES
[address] [place] [address] Drive  [address] [place] [address]. [address] [place] [address]  Office:  [phone]  Fax: [phone]  Chief Complaint   Patient presents with    Migraine       HPI: Ann Martinez presents in follow-up for migraine headaches. She was seen here since November 2019 by KENNY Vasquez. It was noted that she has had a longstanding history of migraines previously associated with her menstrual cycle. She has history to include endometriosis and hysterectomy in 2013. On hormone replacement therapy. Triggers include heat and weather. Fall and winter worsened headaches. Around the time of the initial visit, she started having new development of lightheadedness and visual disturbance which was intermittent and associated with headaches. MRI cervical spine showed multilevel disc disease. MRI brain was done which was normal.  It was noted that she had improvement of the headaches on Topamax but had side effects of brain fogginess. Zonegran was started for headaches. She was started on Maxalt for acute treatment instead of Imitrex. She was going to follow up with neurosurgeon for back pain. At the next visit in December 2019 it was noted that the headaches improved on Zonegran. There was some memory concerns noted so vitamin B12 level was to be checked which was normal.  At the next visit in July 2020 it was noted that she switch back to Premarin pill from estrogen cream and that helped some for the migraines. Zonegran was increased to 75 mg at night. Maxalt changed to Maxalt melt due to nausea. She was last seen here by KENNY Vasquez on 1/31/2022. It was noted that she was no longer on Zonegran. Having 3 migraine headache days a month. Takes Maxalt with relief for the migraine but relief is incomplete. Relpax was prescribed for acute treatment. She presents today in follow-up. The migraines come and go. NSAIDs can trigger migraines. Was diagnosed with psoriatic arthritis.   Has

## 2023-12-01 ENCOUNTER — TELEPHONE (OUTPATIENT)
Age: 58
End: 2023-12-01

## 2023-12-01 NOTE — TELEPHONE ENCOUNTER
Spoke with patient verified name and , informed [employer] to obtain Prior Auth and will submit to [facility] SEN ALVARADO., with [employer].

## 2023-12-19 DIAGNOSIS — M54.6 PAIN IN THORACIC SPINE: ICD-10-CM

## 2023-12-19 DIAGNOSIS — M13.0 POLYARTHROPATHY: Primary | ICD-10-CM

## 2023-12-19 DIAGNOSIS — K62.89 CHRONIC IDIOPATHIC ANAL PAIN: ICD-10-CM

## 2023-12-19 DIAGNOSIS — M51.36 DEGENERATION OF LUMBAR INTERVERTEBRAL DISC: ICD-10-CM

## 2023-12-19 DIAGNOSIS — G89.29 CHRONIC IDIOPATHIC ANAL PAIN: ICD-10-CM

## 2023-12-19 DIAGNOSIS — L60.9 NAIL DISEASE: ICD-10-CM

## 2024-01-05 ENCOUNTER — TELEPHONE (OUTPATIENT)
Age: 59
End: 2024-01-05

## 2024-01-09 DIAGNOSIS — G43.109 MIGRAINE WITH AURA AND WITHOUT STATUS MIGRAINOSUS, NOT INTRACTABLE: ICD-10-CM

## 2024-01-10 RX ORDER — ATENOLOL 50 MG/1
50 TABLET ORAL DAILY
Qty: 30 TABLET | Refills: 5 | Status: SHIPPED | OUTPATIENT
Start: 2024-01-10 | End: 2024-01-12 | Stop reason: SDUPTHER

## 2024-01-12 ENCOUNTER — OFFICE VISIT (OUTPATIENT)
Age: 59
End: 2024-01-12

## 2024-01-12 VITALS
BODY MASS INDEX: 31.56 KG/M2 | HEART RATE: 67 BPM | OXYGEN SATURATION: 98 % | WEIGHT: 189.4 LBS | RESPIRATION RATE: 20 BRPM | HEIGHT: 65 IN | DIASTOLIC BLOOD PRESSURE: 78 MMHG | SYSTOLIC BLOOD PRESSURE: 136 MMHG | TEMPERATURE: 97.6 F

## 2024-01-12 DIAGNOSIS — G43.109 MIGRAINE WITH AURA AND WITHOUT STATUS MIGRAINOSUS, NOT INTRACTABLE: ICD-10-CM

## 2024-01-12 RX ORDER — ATENOLOL 50 MG/1
50 TABLET ORAL DAILY
Qty: 90 TABLET | Refills: 2 | Status: SHIPPED | OUTPATIENT
Start: 2024-01-12

## 2024-01-12 RX ORDER — RIMEGEPANT SULFATE 75 MG/75MG
75 TABLET, ORALLY DISINTEGRATING ORAL AS NEEDED
Qty: 8 TABLET | Refills: 5 | Status: SHIPPED | OUTPATIENT
Start: 2024-01-12

## 2024-01-12 NOTE — PROGRESS NOTES
[facility]  [address], [place] [address]  Office:  [phone]  Fax: [phone]  Chief Complaint   Patient presents with    Follow-up       HPI: Faustina Zarate presents in follow-up for migraine headaches.  History as below.  She was last seen here on 11/3/2023.  Atenolol was increased to 50 mg daily to help for migraines and also to help for BP.  Nurtec was added for acute treatment of migraines.  Sleep specialist evaluation to be considered to evaluate for potential sleep apnea due to recent snoring.    She presents today in follow-up.  She was not able to get Nurtec.  She was told by the [facility] that it was not approved.  She has tried 3 triptans, they do not work very well.  The increase in atenolol was helpful.  The morning headaches are better.  The migraines weren't as bad around the holidays as they normally are.  Has to use the Zofran for associated nausea.  /78 in office.  BP a little elevated at home in the 130s systolic.  Takes magnesium, also potassium.  Helps for her leg cramps.       From previous encounter 11/3/2023:  \"HPI: Faustina Zarate presents in follow-up for migraine headaches.  She was seen here since November 2019 by KENNY Vasquez.  It was noted that she has had a longstanding history of migraines previously associated with her menstrual cycle.  She has history to include endometriosis and hysterectomy in 2013.  On hormone replacement therapy.  Triggers include heat and weather.  Fall and winter worsened headaches.  Around the time of the initial visit, she started having new development of lightheadedness and visual disturbance which was intermittent and associated with headaches.  MRI cervical spine showed multilevel disc disease.  MRI brain was done which was normal.  It was noted that she had improvement of the headaches on Topamax but had side effects of brain fogginess.  Zonegran was started for headaches.  She was

## 2024-01-17 ENCOUNTER — TELEPHONE (OUTPATIENT)
Facility: CLINIC | Age: 59
End: 2024-01-17

## 2024-01-17 NOTE — TELEPHONE ENCOUNTER
Pt called in to check stauts of her 3 referral dr francois sent out to rheumatology, pain management, and dermatology she said she went online for  and she sees nothing through the website and wants a call back

## 2024-01-29 ENCOUNTER — TELEPHONE (OUTPATIENT)
Facility: CLINIC | Age: 59
End: 2024-01-29

## 2024-01-29 NOTE — TELEPHONE ENCOUNTER
Patient called stating that her referral for Pain Management went to the wrong office and requesting if we could resubmit her referral out to Dr. Jack Gallardo office in [facility] ([facility]). Patient states that she has an upcoming appointment scheduled with Dr. Gallardo 02/03/2024 and need referral to be processed before her appointment date.

## 2024-01-30 ENCOUNTER — TELEPHONE (OUTPATIENT)
Age: 59
End: 2024-01-30

## 2024-01-30 DIAGNOSIS — G43.109 MIGRAINE WITH AURA AND WITHOUT STATUS MIGRAINOSUS, NOT INTRACTABLE: ICD-10-CM

## 2024-01-30 NOTE — TELEPHONE ENCOUNTER
Pt. Called and stated that she received a denial for her Nurtec and is going to fax over the appeal with consent so Roly can appeal it on her behalf.

## 2024-02-06 DIAGNOSIS — G43.109 MIGRAINE WITH AURA AND WITHOUT STATUS MIGRAINOSUS, NOT INTRACTABLE: ICD-10-CM

## 2024-02-06 RX ORDER — ELETRIPTAN HYDROBROMIDE 40 MG/1
TABLET, FILM COATED ORAL
Qty: 12 TABLET | Refills: 5 | Status: SHIPPED | OUTPATIENT
Start: 2024-02-06

## 2024-02-06 RX ORDER — ELETRIPTAN HYDROBROMIDE 40 MG/1
TABLET, FILM COATED ORAL
Qty: 12 TABLET | Refills: 5 | Status: CANCELLED | OUTPATIENT
Start: 2024-02-06

## 2024-02-27 ENCOUNTER — TELEPHONE (OUTPATIENT)
Facility: CLINIC | Age: 59
End: 2024-02-27

## 2024-02-27 NOTE — TELEPHONE ENCOUNTER
Called Mrs Eben Zarate. She did get into rheumatology .  Will need new referral to vascular she will reach out to see if Hilario is still in network if not she would like referral to [facility] for varicose veins

## 2024-03-12 ENCOUNTER — TELEPHONE (OUTPATIENT)
Facility: CLINIC | Age: 59
End: 2024-03-12

## 2024-03-12 NOTE — TELEPHONE ENCOUNTER
----- Message from Chitra Aguilar MD sent at 3/11/2024  4:21 PM EDT -----  Please have pt sched routine f/u appt.

## 2024-06-26 ENCOUNTER — TELEPHONE (OUTPATIENT)
Facility: CLINIC | Age: 59
End: 2024-06-26

## 2024-06-26 DIAGNOSIS — Z86.79 H/O VARICOSE VEINS: Primary | ICD-10-CM

## 2024-06-26 NOTE — TELEPHONE ENCOUNTER
Ms. Zarate was made aware that she need a appt. And labs before referral is processed. Called was transferred to fiordaliza.

## 2024-06-26 NOTE — TELEPHONE ENCOUNTER
----- Message from Yuliet Cardenas MA sent at 6/25/2024  4:04 PM EDT -----  Regarding: FW: Vascular referral  Contact: [phone]    ----- Message -----  From: Faustina Zarate  Sent: 6/25/2024   2:55 PM EDT  To: #  Subject: Vascular referral                                Good afternoon! I need a new vascular referral, (for varicose veins). My previous doctor stopped taking my insurance. I believe Dr Aguilar nurse told me you have a good Vascular doctor. Many thanks.

## 2024-06-26 NOTE — TELEPHONE ENCOUNTER
Called patient. She states that her previous Vascular Specialist no longer accepts her insurance. Patient states that she was being seen for her vericose veins in her legs. New referral generated

## 2024-06-27 ASSESSMENT — ENCOUNTER SYMPTOMS: RESPIRATORY NEGATIVE: 1

## 2024-06-27 NOTE — PROGRESS NOTES
ASSESSMENT/PLAN:  1. Essential (primary) hypertension  Assessment & Plan:   Well-controlled, continue current medications  Orders:  -     Lipid Panel; Future  2. Other migraine, not intractable, without status migrainosus  Assessment & Plan:   Monitored by specialist- no acute findings meriting change in the plan  3. Gastroesophageal reflux disease, unspecified whether esophagitis present  -     pantoprazole (PROTONIX) 40 MG tablet; Take 1 tablet by mouth every morning (before breakfast), Disp-90 tablet, R-4Normal  4. Varicose veins of both lower extremities with pain  -     [facility] [place] ([facility])        Return in about 4 months (around 10/28/2024) for HTN, gerd, lab review.      SUBJECTIVE/OBJECTIVE:    Chief Complaint   Patient presents with    Hypertension    Referral - General     Pt would like a referral to Vascular for varicose veins         HPI    Faustina Zarate is a 59 y.o. female presenting today for    delayed follow up of migraine, vitamin D deficiency, hypertension.  Patient is following with rheumatology for psoriatic arthritis, osteoarthritis, chronic pain of both thumbs.    Pt cont to f/u with neuro and rheum.      Pt needs a referral to vasc surg to cont care for painful varicose veins.      Patient does need medication refills today.   Previously, her insurance did not cover her pantoprazole.  She had to try a different PPI that did not work as well.  She would like to try the pantoprazole again.    New concerns today: none        Review of Systems   Constitutional: Negative.    HENT: Negative.     Respiratory: Negative.     Cardiovascular: Negative.    All other systems reviewed and are negative.      Physical Exam  Vitals and nursing note reviewed.   Constitutional:       General: She is not in acute distress.     Appearance: Normal appearance.   HENT:      Head: Normocephalic and atraumatic.      Right Ear: External ear normal.      Left

## 2024-06-28 ENCOUNTER — OFFICE VISIT (OUTPATIENT)
Facility: CLINIC | Age: 59
End: 2024-06-28
Payer: OTHER GOVERNMENT

## 2024-06-28 VITALS
SYSTOLIC BLOOD PRESSURE: 117 MMHG | RESPIRATION RATE: 20 BRPM | HEIGHT: 66 IN | BODY MASS INDEX: 30.86 KG/M2 | TEMPERATURE: 97.7 F | WEIGHT: 192 LBS | OXYGEN SATURATION: 98 % | DIASTOLIC BLOOD PRESSURE: 81 MMHG | HEART RATE: 77 BPM

## 2024-06-28 DIAGNOSIS — I83.813 VARICOSE VEINS OF BOTH LOWER EXTREMITIES WITH PAIN: ICD-10-CM

## 2024-06-28 DIAGNOSIS — G43.809 OTHER MIGRAINE, NOT INTRACTABLE, WITHOUT STATUS MIGRAINOSUS: ICD-10-CM

## 2024-06-28 DIAGNOSIS — I10 ESSENTIAL (PRIMARY) HYPERTENSION: Primary | ICD-10-CM

## 2024-06-28 DIAGNOSIS — K21.9 GASTROESOPHAGEAL REFLUX DISEASE, UNSPECIFIED WHETHER ESOPHAGITIS PRESENT: ICD-10-CM

## 2024-06-28 PROCEDURE — 3074F SYST BP LT 130 MM HG: CPT | Performed by: FAMILY MEDICINE

## 2024-06-28 PROCEDURE — 99214 OFFICE O/P EST MOD 30 MIN: CPT | Performed by: FAMILY MEDICINE

## 2024-06-28 PROCEDURE — 3079F DIAST BP 80-89 MM HG: CPT | Performed by: FAMILY MEDICINE

## 2024-06-28 RX ORDER — PANTOPRAZOLE SODIUM 40 MG/1
40 TABLET, DELAYED RELEASE ORAL
Qty: 90 TABLET | Refills: 4 | Status: SHIPPED | OUTPATIENT
Start: 2024-06-28

## 2024-06-28 RX ORDER — ADALIMUMAB 40MG/0.4ML
KIT SUBCUTANEOUS
COMMUNITY
Start: 2024-05-24

## 2024-06-28 SDOH — ECONOMIC STABILITY: HOUSING INSECURITY
IN THE LAST 12 MONTHS, WAS THERE A TIME WHEN YOU DID NOT HAVE A STEADY PLACE TO SLEEP OR SLEPT IN A SHELTER (INCLUDING NOW)?: NO

## 2024-06-28 SDOH — ECONOMIC STABILITY: FOOD INSECURITY: WITHIN THE PAST 12 MONTHS, THE FOOD YOU BOUGHT JUST DIDN'T LAST AND YOU DIDN'T HAVE MONEY TO GET MORE.: NEVER TRUE

## 2024-06-28 SDOH — ECONOMIC STABILITY: INCOME INSECURITY: HOW HARD IS IT FOR YOU TO PAY FOR THE VERY BASICS LIKE FOOD, HOUSING, MEDICAL CARE, AND HEATING?: NOT HARD AT ALL

## 2024-06-28 SDOH — ECONOMIC STABILITY: FOOD INSECURITY: WITHIN THE PAST 12 MONTHS, YOU WORRIED THAT YOUR FOOD WOULD RUN OUT BEFORE YOU GOT MONEY TO BUY MORE.: NEVER TRUE

## 2024-06-28 ASSESSMENT — PATIENT HEALTH QUESTIONNAIRE - PHQ9
SUM OF ALL RESPONSES TO PHQ QUESTIONS 1-9: 0
1. LITTLE INTEREST OR PLEASURE IN DOING THINGS: NOT AT ALL
2. FEELING DOWN, DEPRESSED OR HOPELESS: NOT AT ALL
SUM OF ALL RESPONSES TO PHQ9 QUESTIONS 1 & 2: 0
SUM OF ALL RESPONSES TO PHQ QUESTIONS 1-9: 0

## 2024-06-28 NOTE — PROGRESS NOTES
Faustina Zarate presents today for   Chief Complaint   Patient presents with    Hypertension    Referral - General     Pt would like a referral to Vascular for varicose veins       Is someone accompanying this pt? no    Is the patient using any DME equipment during OV? no    Depression Screenin/28/2024    10:54 AM 2021    10:11 AM   PHQ-9 Questionaire   Little interest or pleasure in doing things 0 0   Feeling down, depressed, or hopeless 0 0   PHQ-9 Total Score 0 0        SULEIMAN 7-Anxiety        No data to display                   Learning Assessment:  No question data found.     Fall Risk       No data to display                   Travel Screening:    Travel Screening     No screening recorded since 24 0000       Travel History   Travel since 24    No documented travel since 24            Health Maintenance reviewed and discussed and ordered per Provider.  Transportation Needs: Unknown (2024)    PRAPARE - Transportation     Lack of Transportation (Medical): Not on file     Lack of Transportation (Non-Medical): No      Food Insecurity: No Food Insecurity (2024)    Hunger Vital Sign     Worried About Running Out of Food in the Last Year: Never true     Ran Out of Food in the Last Year: Never true     Financial Resource Strain: Low Risk  (2024)    Overall Financial Resource Strain (CARDIA)     Difficulty of Paying Living Expenses: Not hard at all     Housing Stability: Unknown (2024)    Housing Stability Vital Sign     Unable to Pay for Housing in the Last Year: Not on file     Number of Places Lived in the Last Year: Not on file     Unstable Housing in the Last Year: No       Did you provide resources if patient requested them? no      Health Maintenance Due   Topic Date Due    Hepatitis B vaccine (1 of 3 - 3-dose series) Never done    COVID-19 Vaccine (1) Never done    Depression Screen  Never done    HIV screen  Never done    DTaP/Tdap/Td vaccine (1 - Tdap)

## 2024-06-30 PROBLEM — I83.813 VARICOSE VEINS OF BOTH LOWER EXTREMITIES WITH PAIN: Status: ACTIVE | Noted: 2024-06-30

## 2024-06-30 PROBLEM — G43.809 OTHER MIGRAINE, NOT INTRACTABLE, WITHOUT STATUS MIGRAINOSUS: Status: ACTIVE | Noted: 2017-11-03

## 2024-07-19 ENCOUNTER — OFFICE VISIT (OUTPATIENT)
Age: 59
End: 2024-07-19
Payer: OTHER GOVERNMENT

## 2024-07-19 VITALS
HEIGHT: 65 IN | HEART RATE: 89 BPM | SYSTOLIC BLOOD PRESSURE: 130 MMHG | WEIGHT: 192.4 LBS | TEMPERATURE: 98.3 F | DIASTOLIC BLOOD PRESSURE: 80 MMHG | OXYGEN SATURATION: 100 % | BODY MASS INDEX: 32.06 KG/M2

## 2024-07-19 DIAGNOSIS — G43.109 MIGRAINE WITH AURA AND WITHOUT STATUS MIGRAINOSUS, NOT INTRACTABLE: Primary | ICD-10-CM

## 2024-07-19 PROCEDURE — 3079F DIAST BP 80-89 MM HG: CPT | Performed by: NURSE PRACTITIONER

## 2024-07-19 PROCEDURE — 99213 OFFICE O/P EST LOW 20 MIN: CPT | Performed by: NURSE PRACTITIONER

## 2024-07-19 PROCEDURE — 3075F SYST BP GE 130 - 139MM HG: CPT | Performed by: NURSE PRACTITIONER

## 2024-07-19 RX ORDER — ELETRIPTAN HYDROBROMIDE 40 MG/1
TABLET, FILM COATED ORAL
Qty: 12 TABLET | Refills: 5 | Status: SHIPPED | OUTPATIENT
Start: 2024-07-19

## 2024-07-19 RX ORDER — UBROGEPANT 100 MG/1
100 TABLET ORAL AS NEEDED
Qty: 16 TABLET | Refills: 11 | Status: SHIPPED | OUTPATIENT
Start: 2024-07-19

## 2024-07-19 RX ORDER — ONDANSETRON 4 MG/1
TABLET, ORALLY DISINTEGRATING ORAL
Qty: 20 TABLET | Refills: 5 | Status: SHIPPED | OUTPATIENT
Start: 2024-07-19

## 2024-07-19 RX ORDER — ATENOLOL 50 MG/1
50 TABLET ORAL DAILY
Qty: 90 TABLET | Refills: 2 | Status: SHIPPED | OUTPATIENT
Start: 2024-07-19

## 2024-07-19 RX ORDER — DIAZEPAM 5 MG/1
TABLET ORAL
COMMUNITY
Start: 2024-05-02

## 2024-07-19 NOTE — PROGRESS NOTES
medications for this visit.        Allergies   Allergen Reactions    Leflunomide Other (See Comments)     Other Reaction(s): GI intolerance, Malaise/Fatigue    Nsaids Other (See Comments)     Raises blood pressure    Prednisone Other (See Comments)     muscle/joint pain    Methotrexate Diarrhea, Nausea And Vomiting and Other (See Comments)     Other Reaction(s): GI intolerance, Malaise/Fatigue       Social History     Tobacco Use    Smoking status: Former     Current packs/day: 0.00     Types: Cigarettes     Quit date: 1991     Years since quittin.5    Smokeless tobacco: Never   Substance Use Topics    Alcohol use: Yes     Comment: seldom    Drug use: No       Family History   Problem Relation Age of Onset    Diabetes Father     Hypertension Father     Breast Cancer Maternal Aunt     Hypertension Mother     Cancer Father         Prostate    Breast Cancer Paternal Aunt        Review of Systems:  GENERAL: Denies fever or fatigue  CARDIAC: No CP or SOB  PULMONARY: No cough or SOB  MUSCULOSKELETAL: No new joint pain  NEURO: SEE HPI    Physical Examination:  /80 (Site: Left Upper Arm, Position: Sitting, Cuff Size: Large Adult)   Pulse 89   Temp 98.3 °F (36.8 °C) (Oral)   Ht 1.651 m (5' 5\")   Wt 87.3 kg (192 lb 6.4 oz)   SpO2 100%   BMI 32.02 kg/m²     Alert, in NAD. Heart is regular. Oriented x3. Speech is fluent. Speech clear. EOMs are full. No facial asymmetry noted. Moves all extremities. Gait is normal.       Impression/Plan:  This is a 59-year-old right-handed female who presents in follow-up for migraines.  She has a longstanding history of migraines previously associated with her menstrual cycle. She had hysterectomy in . She is on estrogen replacement which she has found to be helpful overall and in terms of her migraine frequency. She has tried migraine preventatives of Topamax, Zonegran, and amitriptyline which caused brain fog.  She is taking atenolol for hypertension which was

## 2024-07-19 NOTE — PATIENT INSTRUCTIONS
Patient instructions:  -will send Ubrelvy 100 mg tab for acute migraine treatment. First try a half tablet. May repeat dose dose x 1 in 2 hours if needed.  Let us know if issues getting Ubrelvy.   -magnesium 400 mg daily at night (mag glycinate or mag oxide)      https://americanmigrainefoundation.org/    https://headaches.org/    https://americanheadachesociety.org/  https://americanmigrainefoundation.org/resource-library/migraine-and-diet/

## 2024-08-13 ENCOUNTER — TELEPHONE (OUTPATIENT)
Facility: CLINIC | Age: 59
End: 2024-08-13

## 2024-08-13 NOTE — TELEPHONE ENCOUNTER
Patient states that her referral to Vascular Surgery is still pending due to [facility] not receiving the referral. She mentioned that her appointment is coming up next week and needs the referral sent over to [facility].

## 2024-08-27 ENCOUNTER — OFFICE VISIT (OUTPATIENT)
Age: 59
End: 2024-08-27
Payer: OTHER GOVERNMENT

## 2024-08-27 VITALS
DIASTOLIC BLOOD PRESSURE: 84 MMHG | SYSTOLIC BLOOD PRESSURE: 120 MMHG | HEART RATE: 66 BPM | HEIGHT: 65 IN | WEIGHT: 189 LBS | BODY MASS INDEX: 31.49 KG/M2 | OXYGEN SATURATION: 99 %

## 2024-08-27 DIAGNOSIS — I83.812 VARICOSE VEINS OF LEFT LOWER EXTREMITY WITH PAIN: Primary | ICD-10-CM

## 2024-08-27 PROCEDURE — 3079F DIAST BP 80-89 MM HG: CPT | Performed by: SURGERY

## 2024-08-27 PROCEDURE — 99203 OFFICE O/P NEW LOW 30 MIN: CPT | Performed by: SURGERY

## 2024-08-27 PROCEDURE — 3074F SYST BP LT 130 MM HG: CPT | Performed by: SURGERY

## 2024-08-27 NOTE — PROGRESS NOTES
CALCIUM 9.0 05/09/2023    BILITOT 0.5 05/09/2023    ALKPHOS 60 05/09/2023    AST 14 05/09/2023    ALT 19 05/09/2023    LABGLOM >60 05/09/2023    GFRAA >60 05/14/2021    AGRATIO 1.0 05/14/2021    GLOB 3.2 05/09/2023       Lab Results   Component Value Date    WBC 6.2 05/14/2021    HGB 12.5 05/14/2021    HCT 39.2 05/14/2021    MCV 88.9 05/14/2021     05/14/2021       No results found for: \"LABA1C\"      Physical Exam:    /84   Pulse 66   Ht 1.651 m (5' 5\")   Wt 85.7 kg (189 lb)   SpO2 99%   BMI 31.45 kg/m²      Constitutional:  Patient is well developed, well nourished, and not distressed.   Cardiovascular:  Normal rate, regular rhythm  Pulses: unable to palpate pedal pulses  Pulmonary/Chest: Effort normal    Extremities:  Bilateral trace edema. Mild BLE hyperpigmentation, no lipodermatosclerosis. Varicose veins are  present, large varicose veins originating off the mid thigh GSV and extending medially and distally.  Bilateral corona phlebectatica. Digits no cyanosis or clubbing  Neurological:  she  is alert and oriented x3 . Gait normal.   Psych: Appropriate mood and affect.   Skin:  Skin is warm and dry. No rash noted. No erythema. No ulcers.        Impression and Plan:  Faustina Zarate is a 59 y.o. female with      LLE varicose veins with pain. Symptoms may be consistent with venous insufficiency or recurrence of disease given prior h/o ablation.   Discussed pathophysiology of reflux.   - will obtain LLE venous insufficiency study  - recommended continued 20-30mmHg knee high compression stockings and leg elevation       We reviewed the plan with the patient and the patient understands.        Past Medical History:   Diagnosis Date    Back problem     Chronic pain     Constipation     COVID 01/2023    DDD (degenerative disc disease), cervical     DDD (degenerative disc disease), lumbar     GERD (gastroesophageal reflux disease)     Hypertension     Joint pain     Migraine 11/03/2017     tablet PLACE 1 TABLET ON THE TONGUE EVERY 8 HOURS AS NEEDED FOR NAUSEA 20 tablet 5    acetaminophen (TYLENOL) 325 MG tablet Take 2 tablets by mouth every 6 hours (Patient not taking: Reported on 2024)       No current facility-administered medications for this visit.     Allergies   Allergen Reactions    Leflunomide Other (See Comments)     Other Reaction(s): GI intolerance, Malaise/Fatigue    Nsaids Other (See Comments)     Raises blood pressure    Prednisone Other (See Comments)     muscle/joint pain    Methotrexate Diarrhea, Nausea And Vomiting and Other (See Comments)     Other Reaction(s): GI intolerance, Malaise/Fatigue     Social History     Socioeconomic History    Marital status:      Spouse name: Not on file    Number of children: Not on file    Years of education: Not on file    Highest education level: Not on file   Occupational History    Not on file   Tobacco Use    Smoking status: Former     Current packs/day: 0.00     Types: Cigarettes     Quit date: 1991     Years since quittin.6    Smokeless tobacco: Never   Substance and Sexual Activity    Alcohol use: Yes     Comment: seldom    Drug use: No    Sexual activity: Not on file   Other Topics Concern    Not on file   Social History Narrative    Not on file     Social Determinants of Health     Financial Resource Strain: Low Risk  (2024)    Overall Financial Resource Strain (CARDIA)     Difficulty of Paying Living Expenses: Not hard at all   Food Insecurity: No Food Insecurity (2024)    Hunger Vital Sign     Worried About Running Out of Food in the Last Year: Never true     Ran Out of Food in the Last Year: Never true   Transportation Needs: Unknown (2024)    PRAPARE - Transportation     Lack of Transportation (Medical): Not on file     Lack of Transportation (Non-Medical): No   Physical Activity: Not on file   Stress: Not on file   Social Connections: Not on file   Intimate Partner Violence: Not on file   Housing

## 2024-10-14 NOTE — PROGRESS NOTES
Faustina Zarate    Chief Complaint   Patient presents with    Follow-up     Varicose veins of left lower extremity with pain       History and Physical    Faustina Zarate is a 59 y.o. female with PMH significant for hypertension, GERD, degenerative disc disease, migraine, psoriatic arthritis on Humira. Raynauld's syndrome    she returns today for BLE varicose veins.     Since her last visit:   - no change in her symptoms    The most recent vascular imaging study was reviewed and discussed with the patient.       Interpretation Summary  Show Result Comparison     No evidence of deep vein thrombosis in the left lower extremity or contralateral common femoral vein. Vessels demonstrate normal compressibility, color filling, and phasic and spontaneous flow.    Superficial venous insufficiency (>0.5 sec) noted in the left small saphenous and posterior accessory veins.    Deep venous insufficiency (>1.0 sec) noted in the left common femoral vein.    The left GSV obliterates just beyond the SFJ and cannot be visualized in the thigh or calf. Tributaries noted in the calf.    The right contralateral GSV is patent with reflux measuring 4.5 seconds at the SFJ.     Procedure Details    A gray scale, color Doppler imaging and spectral Doppler analysis ultrasound was performed. During the study longitudinal and transverse views were obtained. Pulsed wave doppler was performed.     Lower Extremity Venous Findings    Right Lower Venous    For comparison purposes, the right common femoral vein was briefly interrogated. The vein demonstrates normal color filling and compressibility. Doppler flow was phasic and spontaneous.   Left Lower Venous    No evidence of deep vein thrombosis. The common femoral, saphenofemoral junction, profunda, femoral, popliteal, posterior tibial, and peroneal veins were all imaged in the transverse view with normal compressibility and in the sagittal view with spontaneous, phasic flow, and normal  - Hyperkalemia now resolved s/p urgent HD  - Will need SW evaluation re: transportation issues to and from HD   - As per chart review, pt is known to be noncompliant with dietary intake as well, will have dietician meet with patient  - Will d/c Enalapril 2/2 hyperkalemia

## 2024-10-15 ENCOUNTER — OFFICE VISIT (OUTPATIENT)
Age: 59
End: 2024-10-15
Payer: OTHER GOVERNMENT

## 2024-10-15 VITALS
WEIGHT: 189 LBS | SYSTOLIC BLOOD PRESSURE: 122 MMHG | HEART RATE: 68 BPM | DIASTOLIC BLOOD PRESSURE: 68 MMHG | BODY MASS INDEX: 31.49 KG/M2 | OXYGEN SATURATION: 98 % | RESPIRATION RATE: 18 BRPM | HEIGHT: 65 IN

## 2024-10-15 DIAGNOSIS — I83.812 VARICOSE VEINS OF LEFT LOWER EXTREMITY WITH PAIN: Primary | ICD-10-CM

## 2024-10-15 PROCEDURE — 99214 OFFICE O/P EST MOD 30 MIN: CPT | Performed by: SURGERY

## 2024-10-15 PROCEDURE — 3078F DIAST BP <80 MM HG: CPT | Performed by: SURGERY

## 2024-10-15 PROCEDURE — 3074F SYST BP LT 130 MM HG: CPT | Performed by: SURGERY

## 2024-10-29 ENCOUNTER — OFFICE VISIT (OUTPATIENT)
Facility: CLINIC | Age: 59
End: 2024-10-29
Payer: OTHER GOVERNMENT

## 2024-10-29 VITALS
TEMPERATURE: 97.8 F | BODY MASS INDEX: 32.49 KG/M2 | WEIGHT: 195 LBS | HEART RATE: 67 BPM | RESPIRATION RATE: 14 BRPM | OXYGEN SATURATION: 98 % | HEIGHT: 65 IN | SYSTOLIC BLOOD PRESSURE: 117 MMHG | DIASTOLIC BLOOD PRESSURE: 75 MMHG

## 2024-10-29 DIAGNOSIS — I10 ESSENTIAL (PRIMARY) HYPERTENSION: Primary | ICD-10-CM

## 2024-10-29 DIAGNOSIS — I83.813 VARICOSE VEINS OF BOTH LOWER EXTREMITIES WITH PAIN: ICD-10-CM

## 2024-10-29 DIAGNOSIS — L40.50 PSORIATIC ARTHRITIS (HCC): ICD-10-CM

## 2024-10-29 DIAGNOSIS — R25.2 MUSCLE CRAMPS: ICD-10-CM

## 2024-10-29 DIAGNOSIS — K21.9 GASTROESOPHAGEAL REFLUX DISEASE, UNSPECIFIED WHETHER ESOPHAGITIS PRESENT: ICD-10-CM

## 2024-10-29 PROCEDURE — 3078F DIAST BP <80 MM HG: CPT | Performed by: FAMILY MEDICINE

## 2024-10-29 PROCEDURE — 99214 OFFICE O/P EST MOD 30 MIN: CPT | Performed by: FAMILY MEDICINE

## 2024-10-29 PROCEDURE — 3074F SYST BP LT 130 MM HG: CPT | Performed by: FAMILY MEDICINE

## 2024-10-29 RX ORDER — ESTRADIOL 0.07 MG/D
1 PATCH TRANSDERMAL WEEKLY
COMMUNITY

## 2024-10-29 SDOH — ECONOMIC STABILITY: FOOD INSECURITY: WITHIN THE PAST 12 MONTHS, THE FOOD YOU BOUGHT JUST DIDN'T LAST AND YOU DIDN'T HAVE MONEY TO GET MORE.: NEVER TRUE

## 2024-10-29 SDOH — ECONOMIC STABILITY: FOOD INSECURITY: WITHIN THE PAST 12 MONTHS, YOU WORRIED THAT YOUR FOOD WOULD RUN OUT BEFORE YOU GOT MONEY TO BUY MORE.: NEVER TRUE

## 2024-10-29 SDOH — ECONOMIC STABILITY: INCOME INSECURITY: HOW HARD IS IT FOR YOU TO PAY FOR THE VERY BASICS LIKE FOOD, HOUSING, MEDICAL CARE, AND HEATING?: NOT HARD AT ALL

## 2024-10-29 ASSESSMENT — PATIENT HEALTH QUESTIONNAIRE - PHQ9
1. LITTLE INTEREST OR PLEASURE IN DOING THINGS: NOT AT ALL
SUM OF ALL RESPONSES TO PHQ QUESTIONS 1-9: 0
SUM OF ALL RESPONSES TO PHQ9 QUESTIONS 1 & 2: 0
2. FEELING DOWN, DEPRESSED OR HOPELESS: NOT AT ALL
SUM OF ALL RESPONSES TO PHQ QUESTIONS 1-9: 0

## 2024-10-29 ASSESSMENT — ENCOUNTER SYMPTOMS: RESPIRATORY NEGATIVE: 1

## 2024-10-29 NOTE — PROGRESS NOTES
ASSESSMENT/PLAN:  1. Essential (primary) hypertension  Assessment & Plan:   Chronic, at goal (stable), continue current treatment plan  2. Gastroesophageal reflux disease, unspecified whether esophagitis present  Assessment & Plan:   Chronic, at goal (stable), continue current treatment plan  3. Varicose veins of both lower extremities with pain  Assessment & Plan:   Monitored by specialist- no acute findings meriting change in the plan  4. Psoriatic arthritis (HCC)  Assessment & Plan:   Monitored by specialist- no acute findings meriting change in the plan  5. Muscle cramps  -     Magnesium; Future        Return in about 4 months (around 2/28/2025) for HTN, gerd, lab review.      SUBJECTIVE/OBJECTIVE:    Chief Complaint   Patient presents with    Gastroesophageal Reflux    Hypertension    Discuss Labs         HPI    Faustina Zarate is a 59 y.o. female presenting today for   4 months follow up of hypertension, GERD.    Patient reports that her reflux is better with pantoprazole    Labs not yet completed    Patient had recent follow-up with vascular surgery for left leg varicose veins.  She has failed conservative therapy.  They discussed surgical intervention.  Radiofrequency ablation was recommended.  They are planning to do this in Jan 2025.      Patient continues to follow-up with orthopedics/pain for thoracic spondylosis and lumbar radiculopathy.    Patient is also following regularly with rheumatology for her psoriatic arthritis.  She had injections that have been helpful.      Patient does not need medication refills today.      New concerns today: pt notes that she is having massive muscle cramps lately.          Review of Systems   Constitutional: Negative.    HENT: Negative.     Respiratory: Negative.     Cardiovascular: Negative.    All other systems reviewed and are negative.      Physical Exam  Vitals and nursing note reviewed.   Constitutional:       General: She is not in acute distress.

## 2024-10-31 PROBLEM — L40.50 PSORIATIC ARTHRITIS (HCC): Status: ACTIVE | Noted: 2024-10-31

## 2024-11-01 ENCOUNTER — TELEPHONE (OUTPATIENT)
Facility: CLINIC | Age: 59
End: 2024-11-01

## 2024-11-01 NOTE — TELEPHONE ENCOUNTER
Ms. Zarate is trying to have an epidural on her lumbar spine. She is requesting that her referral for pain medicine is updated to say lumbar spine because the insurance kicked it back since the current referral has thoracic spine on it. I advised patient that I will have clinical staff reach out regarding this request.

## 2024-11-14 ENCOUNTER — TELEPHONE (OUTPATIENT)
Facility: CLINIC | Age: 59
End: 2024-11-14

## 2024-11-14 NOTE — TELEPHONE ENCOUNTER
11/14/24  Patient has called in this morning stating that her appointment for her epidural steroid injection could be canceled due to the referral from [facility] [place] not being detailed enough. She stressed that this matter was urgent/she needed this referral to be completed within the next 30 minutes. She stated that she understood that the clinical staff had patients to attend to. She is asking that the referral states \"Lumbar epidural steroid injection, procedure and follow up\" I was able to catch Chandrika before she roomed a patient to see how to proceed with this matter. I was informed that they have been asking what the referral needed to say before this appointment and unfortunately they are in the middle of clinicals and wont be able to get that referral fixed, signed and sent over within the next 30 minutes. Informed patient and she understood.         Called patient. Patient upset due to lack of time management. She states that she's been trying to get new referral generated to state \"Lumbar\" on it so that she can get an epidural. Patient was made aware that referral was updated however patient request changed once again. Call came in today from patient stating referral need to state   Patient states that pain management was able to re-do referral and proceed with epidural injection.

## 2024-12-03 ENCOUNTER — HOSPITAL ENCOUNTER (OUTPATIENT)
Facility: HOSPITAL | Age: 59
Discharge: HOME | End: 2024-12-06
Payer: OTHER GOVERNMENT

## 2024-12-03 VITALS — BODY MASS INDEX: 30.99 KG/M2 | WEIGHT: 186 LBS | HEIGHT: 65 IN

## 2024-12-03 DIAGNOSIS — Z12.31 VISIT FOR SCREENING MAMMOGRAM: ICD-10-CM

## 2024-12-03 PROCEDURE — 77063 BREAST TOMOSYNTHESIS BI: CPT

## 2025-01-20 DIAGNOSIS — I83.812 VARICOSE VEINS OF LEFT LOWER EXTREMITY WITH PAIN: Primary | ICD-10-CM

## 2025-01-20 RX ORDER — CEPHALEXIN 500 MG/1
500 CAPSULE ORAL 2 TIMES DAILY
Qty: 14 CAPSULE | Refills: 0 | Status: SHIPPED | OUTPATIENT
Start: 2025-01-20 | End: 2025-01-27

## 2025-01-20 RX ORDER — DIAZEPAM 5 MG/1
TABLET ORAL
Qty: 2 TABLET | Refills: 0 | Status: SHIPPED | OUTPATIENT
Start: 2025-01-20 | End: 2025-01-25

## 2025-01-20 RX ORDER — TRAMADOL HYDROCHLORIDE 50 MG/1
50 TABLET ORAL 2 TIMES DAILY PRN
Qty: 6 TABLET | Refills: 0 | Status: SHIPPED | OUTPATIENT
Start: 2025-01-20 | End: 2025-01-23

## 2025-01-21 DIAGNOSIS — I83.812 VARICOSE VEINS OF LEFT LOWER EXTREMITY WITH PAIN: Primary | ICD-10-CM

## 2025-01-24 ENCOUNTER — PROCEDURE VISIT (OUTPATIENT)
Age: 60
End: 2025-01-24

## 2025-01-24 ENCOUNTER — TELEPHONE (OUTPATIENT)
Age: 60
End: 2025-01-24

## 2025-01-24 VITALS
DIASTOLIC BLOOD PRESSURE: 70 MMHG | OXYGEN SATURATION: 95 % | WEIGHT: 189 LBS | HEART RATE: 63 BPM | HEIGHT: 66 IN | BODY MASS INDEX: 30.37 KG/M2 | SYSTOLIC BLOOD PRESSURE: 118 MMHG

## 2025-01-24 DIAGNOSIS — I83.812 VARICOSE VEINS OF LEFT LOWER EXTREMITY WITH PAIN: Primary | ICD-10-CM

## 2025-01-24 NOTE — PROGRESS NOTES
Operative Note  Stab Phlebectomies      Date of Service: 01/24/25     Surgeon: Shaye Webster MD PhD    Indication: Limb pain, painful varicose veins and documented venous insufficiency of the left lower extremity with associated varicose veins    Procedure:   Stab phlebectomies of the left lower extremity, 10-20 stabs (CPT code 81593)    Consent: Upon review of the patient's clinical course and symptoms, recommendation is made to proceed with stab phlebectomies of her painful varicose veins. The risks, benefits and alternatives were discussed with the patient prior to procedure and consent was obtained. Risks include, but are not limited to, infection, bleeding, recurrence of varicosities and damage to surrounding structures.    Anesthesia: Local infiltration of lidocaine, tumescent anesthesia (150ml), Valium 5mg PO    Specimen removed: none    EBL: minimal     Complications: none immediately apparent    Description of Procedure in Detail:     The patient was transferred to the procedure suite and lower extremity evaluated in the standing position. The location of prominent and symptomatic varicose veins was marked with indelible marker. The patient was actively involved in identifying the most bothersome clusters of varices. The patient was then positioned on the the table in supine position.  A safety pause was performed with all necessary personnel and equipment in the room.     The affected limb was prepped and draped in standard sterile fashion. The posterior accessory saphenous vein was interrogated with ultrasound. This vein is the origin of the varices. The straight segment of vein was accessed in the proximal thigh under ultrasound guidance. However, I was unable to advance a wire and the vein went into significant spasm. A second attempt at accessing a more proximal location was successful but again the wire could not be passed. The decision was made to proceed with microphlebectomies only.    Local

## 2025-01-24 NOTE — PATIENT INSTRUCTIONS
Post Procedure Instructions for Stab Phlebectomies    Keep wrap on for the next 24 hours, then remove wrap and wear compression stockings during the day for the next two (2) weeks.   Please do not get the wrap wet. Do not shower for the first 48 hours after your procedure. No baths, pools, hot tubs until incisions have healed completely.  A prescription for antibiotics has been sent to your pharmacy. Please pick it up and start taking it tonight. Please complete the full course of antibiotics as prescribed.  Make sure to walk at least 10 minutes per hour for the remainder of the day of your procedure. Avoid walking long distances.   DO NOT lift, push or pull anything over 20 lbs for the next two (2) weeks.   Avoid prolonged standing, walking, climbing of stairs for the next three (3) days. Avoid strenuous activity for the next two (2) weeks.   If you are going to rest after your procedure, place pillows under the leg and elevate so your toes are above the level of your nose.   If you are experiencing BLEEDING, sit down, apply pressure to the bleeding area and elevate the leg. If bleeding does not stop after 20 minutes of pressure, call our office.   If needed, you may take 600mg of ibuprofen or 500 mg of tylenol every 6 hours for pain for the next two days.   Please call our office if you experience any signs or symptoms of infection, including fever (temperature > 100.5 orally), chills, redness, drainage or warmth at the incisions.  Please contact the office if you experience pain due to the bandage wrap, bleeding, increased swelling or leg pain that does not respond to Tylenol or Ibuprofen. The office number is [phone].

## 2025-01-27 ENCOUNTER — PATIENT MESSAGE (OUTPATIENT)
Facility: CLINIC | Age: 60
End: 2025-01-27

## 2025-01-27 DIAGNOSIS — L40.50 PSORIATIC ARTHRITIS (HCC): ICD-10-CM

## 2025-01-27 DIAGNOSIS — M50.30 DDD (DEGENERATIVE DISC DISEASE), CERVICAL: ICD-10-CM

## 2025-01-27 DIAGNOSIS — G57.01 LESION OF SCIATIC NERVE, RIGHT LOWER LIMB: ICD-10-CM

## 2025-01-27 DIAGNOSIS — G89.29 OTHER CHRONIC PAIN: Primary | ICD-10-CM

## 2025-01-27 DIAGNOSIS — M51.362 DEGENERATION OF INTERVERTEBRAL DISC OF LUMBAR REGION WITH DISCOGENIC BACK PAIN AND LOWER EXTREMITY PAIN: ICD-10-CM

## 2025-01-27 DIAGNOSIS — M13.0 POLYARTHRITIS, UNSPECIFIED: ICD-10-CM

## 2025-01-29 ENCOUNTER — NURSE ONLY (OUTPATIENT)
Age: 60
End: 2025-01-29

## 2025-01-29 NOTE — TELEPHONE ENCOUNTER
Patient states that her referral for her lower back pain has . And needs a new referral sent over to .

## 2025-01-29 NOTE — PROGRESS NOTES
PT returns for a post phlebectomy incision check. All incisions intact. Minimal bruising and no pain. Will continue to wear compression stocking and elevation.

## 2025-03-03 NOTE — PROGRESS NOTES
Cancer Father     Stroke Father     Breast Cancer Maternal Aunt     Breast Cancer Paternal Aunt     Breast Cancer Maternal Aunt     Colon Cancer Maternal Uncle     Colon Cancer Maternal Aunt        Shaye Webster MD PhD  Vascular Surgery

## 2025-03-05 ENCOUNTER — OFFICE VISIT (OUTPATIENT)
Age: 60
End: 2025-03-05

## 2025-03-05 VITALS
WEIGHT: 189 LBS | SYSTOLIC BLOOD PRESSURE: 120 MMHG | BODY MASS INDEX: 31.49 KG/M2 | HEIGHT: 65 IN | HEART RATE: 71 BPM | OXYGEN SATURATION: 100 % | DIASTOLIC BLOOD PRESSURE: 60 MMHG

## 2025-03-05 DIAGNOSIS — I83.812 VARICOSE VEINS OF LEFT LOWER EXTREMITY WITH PAIN: Primary | ICD-10-CM

## 2025-03-05 NOTE — PATIENT INSTRUCTIONS
You may start applying Arnica cream to your incisions to help with discoloration and accelerate healing.   Apply a small amount twice daily until satisfactory results are achieved.      Apply heating pad on low heat setting to the affected area at least 3-4 times/day

## 2025-03-18 ENCOUNTER — OFFICE VISIT (OUTPATIENT)
Facility: CLINIC | Age: 60
End: 2025-03-18
Payer: OTHER GOVERNMENT

## 2025-03-18 VITALS
SYSTOLIC BLOOD PRESSURE: 149 MMHG | HEART RATE: 68 BPM | TEMPERATURE: 97.8 F | DIASTOLIC BLOOD PRESSURE: 78 MMHG | WEIGHT: 189 LBS | BODY MASS INDEX: 31.45 KG/M2

## 2025-03-18 DIAGNOSIS — J01.00 ACUTE NON-RECURRENT MAXILLARY SINUSITIS: ICD-10-CM

## 2025-03-18 DIAGNOSIS — R05.1 ACUTE COUGH: ICD-10-CM

## 2025-03-18 DIAGNOSIS — M79.10 MYALGIA: ICD-10-CM

## 2025-03-18 DIAGNOSIS — R09.81 HEAD CONGESTION: ICD-10-CM

## 2025-03-18 DIAGNOSIS — J10.1 INFLUENZA A: Primary | ICD-10-CM

## 2025-03-18 PROCEDURE — 3077F SYST BP >= 140 MM HG: CPT | Performed by: FAMILY MEDICINE

## 2025-03-18 PROCEDURE — 99215 OFFICE O/P EST HI 40 MIN: CPT | Performed by: FAMILY MEDICINE

## 2025-03-18 PROCEDURE — 3078F DIAST BP <80 MM HG: CPT | Performed by: FAMILY MEDICINE

## 2025-03-18 RX ORDER — BENZONATATE 200 MG/1
200 CAPSULE ORAL 3 TIMES DAILY PRN
Qty: 30 CAPSULE | Refills: 1 | Status: SHIPPED | OUTPATIENT
Start: 2025-03-18

## 2025-03-18 RX ORDER — OSELTAMIVIR PHOSPHATE 75 MG/1
75 CAPSULE ORAL 2 TIMES DAILY
Qty: 10 CAPSULE | Refills: 0 | Status: SHIPPED | OUTPATIENT
Start: 2025-03-18 | End: 2025-03-23

## 2025-03-18 SDOH — ECONOMIC STABILITY: FOOD INSECURITY: WITHIN THE PAST 12 MONTHS, YOU WORRIED THAT YOUR FOOD WOULD RUN OUT BEFORE YOU GOT MONEY TO BUY MORE.: NEVER TRUE

## 2025-03-18 SDOH — ECONOMIC STABILITY: FOOD INSECURITY: WITHIN THE PAST 12 MONTHS, THE FOOD YOU BOUGHT JUST DIDN'T LAST AND YOU DIDN'T HAVE MONEY TO GET MORE.: NEVER TRUE

## 2025-03-18 ASSESSMENT — PATIENT HEALTH QUESTIONNAIRE - PHQ9
2. FEELING DOWN, DEPRESSED OR HOPELESS: NOT AT ALL
SUM OF ALL RESPONSES TO PHQ QUESTIONS 1-9: 0
1. LITTLE INTEREST OR PLEASURE IN DOING THINGS: NOT AT ALL

## 2025-03-18 NOTE — PROGRESS NOTES
Faustina Zarate presents today for   Chief Complaint   Patient presents with    Cough     Symptoms started Sunday, patient tested positive for Flu A yesterday however, negative for Covid and Flu B. She states that she been taking Dayquil.     Congestion    Fatigue    Fever    Other     Body aches and chills.    Dizziness       Is someone accompanying this pt? no    Is the patient using any DME equipment during OV? no    Depression Screening:      3/18/2025    10:19 AM 10/29/2024    11:05 AM 6/28/2024    10:54 AM 2/22/2021    10:11 AM   PHQ-9 Questionaire   Little interest or pleasure in doing things 0 0 0 0   Feeling down, depressed, or hopeless 0 0 0 0   PHQ-9 Total Score 0 0 0 0        SULEIMAN 7-Anxiety        No data to display                   Learning Assessment:  No question data found.     Fall Risk       No data to display                   Travel Screening:    Travel Screening       Question Response    Have you been in contact with someone who was sick? No / Unsure    Do you have any of the following new or worsening symptoms? None of these    Have you traveled internationally or domestically in the last month? No          Travel History   Travel since 02/18/25    No documented travel since 02/18/25            Health Maintenance reviewed and discussed and ordered per Provider.  Transportation Needs: No Transportation Needs (3/18/2025)    PRAPARE - Transportation     Lack of Transportation (Medical): No     Lack of Transportation (Non-Medical): No      Food Insecurity: No Food Insecurity (3/18/2025)    Hunger Vital Sign     Worried About Running Out of Food in the Last Year: Never true     Ran Out of Food in the Last Year: Never true     Financial Resource Strain: Low Risk  (10/29/2024)    Overall Financial Resource Strain (CARDIA)     Difficulty of Paying Living Expenses: Not hard at all     Housing Stability: Low Risk  (3/18/2025)    Housing Stability Vital Sign     Unable to Pay for Housing in the Last

## 2025-03-18 NOTE — PROGRESS NOTES
Faustina Zarate (:  1965) is a 60 y.o. female,Established patient, here for evaluation of the following chief complaint(s):  Cough (Symptoms started , patient tested positive for Flu A yesterday however, negative for Covid and Flu B. She states that she been taking Dayquil. ), Congestion, Fatigue, Fever, Other (Body aches and chills.), and Dizziness         Assessment & Plan  Influenza A       Orders:    oseltamivir (TAMIFLU) 75 MG capsule; Take 1 capsule by mouth 2 times daily for 5 days    Acute non-recurrent maxillary sinusitis       Orders:    amoxicillin-clavulanate (AUGMENTIN) 875-125 MG per tablet; Take 1 tablet by mouth 2 times daily for 7 days    Acute cough       Orders:    benzonatate (TESSALON) 200 MG capsule; Take 1 capsule by mouth 3 times daily as needed for Cough    Head congestion     Recommend symptomatic treatment as needed.          Myalgia     Recommend symptomatic treatment as needed.            Return if symptoms worsen or fail to improve.       Subjective   HPI  Patient presents with 2-day history of flu symptoms.   She has cough congestion fatigue fever body aches chills and dizziness.  She did test positive for influenza A.  Her test for COVID and influenza B were negative.      Review of Systems   Constitutional:  Positive for chills, fatigue and fever.   HENT:  Positive for congestion.    Respiratory:  Positive for cough.    Cardiovascular: Negative.    Musculoskeletal:  Positive for myalgias.   Neurological:  Positive for dizziness.          Objective   Physical Exam  Vitals and nursing note reviewed.   Constitutional:       General: She is not in acute distress.     Appearance: Normal appearance.   HENT:      Head: Normocephalic and atraumatic.      Right Ear: Hearing, tympanic membrane, ear canal and external ear normal.      Left Ear: Hearing, tympanic membrane, ear canal and external ear normal.      Nose: Mucosal edema present.      Right Turbinates: Swollen.

## 2025-03-20 ENCOUNTER — PATIENT MESSAGE (OUTPATIENT)
Facility: CLINIC | Age: 60
End: 2025-03-20

## 2025-03-20 ENCOUNTER — TELEPHONE (OUTPATIENT)
Facility: CLINIC | Age: 60
End: 2025-03-20

## 2025-03-24 ASSESSMENT — ENCOUNTER SYMPTOMS: COUGH: 1

## 2025-04-21 ENCOUNTER — TELEPHONE (OUTPATIENT)
Age: 60
End: 2025-04-21

## 2025-05-12 NOTE — PROGRESS NOTES
and Other (See Comments)     Other Reaction(s): GI intolerance, Malaise/Fatigue     Social History     Socioeconomic History    Marital status:      Spouse name: Not on file    Number of children: Not on file    Years of education: Not on file    Highest education level: Not on file   Occupational History    Not on file   Tobacco Use    Smoking status: Former     Current packs/day: 0.00     Types: Cigarettes     Quit date: 1991     Years since quittin.3    Smokeless tobacco: Never   Vaping Use    Vaping status: Never Used   Substance and Sexual Activity    Alcohol use: Yes     Comment: seldom    Drug use: No    Sexual activity: Not Currently   Other Topics Concern    Not on file   Social History Narrative    Not on file     Social Drivers of Health     Financial Resource Strain: Low Risk  (10/29/2024)    Overall Financial Resource Strain (CARDIA)     Difficulty of Paying Living Expenses: Not hard at all   Food Insecurity: No Food Insecurity (3/18/2025)    Hunger Vital Sign     Worried About Running Out of Food in the Last Year: Never true     Ran Out of Food in the Last Year: Never true   Transportation Needs: No Transportation Needs (3/18/2025)    PRAPARE - Transportation     Lack of Transportation (Medical): No     Lack of Transportation (Non-Medical): No   Physical Activity: Not on file   Stress: Not on file   Social Connections: Not on file   Intimate Partner Violence: Not At Risk (10/29/2024)    Humiliation, Afraid, Rape, and Kick questionnaire     Fear of Current or Ex-Partner: No     Emotionally Abused: No     Physically Abused: No     Sexually Abused: No   Housing Stability: Low Risk  (3/18/2025)    Housing Stability Vital Sign     Unable to Pay for Housing in the Last Year: No     Number of Times Moved in the Last Year: 0     Homeless in the Last Year: No      Family History   Problem Relation Age of Onset    Hypertension Mother     Arthritis Mother     Hearing Loss Mother     High Blood

## 2025-05-13 ENCOUNTER — OFFICE VISIT (OUTPATIENT)
Age: 60
End: 2025-05-13
Payer: OTHER GOVERNMENT

## 2025-05-13 VITALS
HEIGHT: 65 IN | SYSTOLIC BLOOD PRESSURE: 128 MMHG | RESPIRATION RATE: 16 BRPM | BODY MASS INDEX: 29.82 KG/M2 | DIASTOLIC BLOOD PRESSURE: 78 MMHG | OXYGEN SATURATION: 99 % | TEMPERATURE: 98.7 F | WEIGHT: 179 LBS | HEART RATE: 66 BPM

## 2025-05-13 DIAGNOSIS — I83.811 VARICOSE VEINS OF RIGHT LOWER EXTREMITY WITH PAIN: Primary | ICD-10-CM

## 2025-05-13 PROCEDURE — 99214 OFFICE O/P EST MOD 30 MIN: CPT | Performed by: SURGERY

## 2025-05-13 PROCEDURE — 3078F DIAST BP <80 MM HG: CPT | Performed by: SURGERY

## 2025-05-13 PROCEDURE — 3074F SYST BP LT 130 MM HG: CPT | Performed by: SURGERY

## 2025-05-15 ENCOUNTER — TELEPHONE (OUTPATIENT)
Age: 60
End: 2025-05-15

## 2025-05-15 DIAGNOSIS — G43.109 MIGRAINE WITH AURA AND WITHOUT STATUS MIGRAINOSUS, NOT INTRACTABLE: ICD-10-CM

## 2025-05-24 RX ORDER — ELETRIPTAN HYDROBROMIDE 40 MG/1
TABLET, FILM COATED ORAL
Qty: 12 TABLET | Refills: 2 | Status: SHIPPED | OUTPATIENT
Start: 2025-05-24

## 2025-06-16 ENCOUNTER — TELEPHONE (OUTPATIENT)
Facility: CLINIC | Age: 60
End: 2025-06-16

## 2025-08-07 ENCOUNTER — TELEPHONE (OUTPATIENT)
Facility: CLINIC | Age: 60
End: 2025-08-07

## 2025-08-07 DIAGNOSIS — L40.50 PSORIATIC ARTHRITIS (HCC): Primary | ICD-10-CM

## 2025-08-07 DIAGNOSIS — K21.9 GASTROESOPHAGEAL REFLUX DISEASE, UNSPECIFIED WHETHER ESOPHAGITIS PRESENT: ICD-10-CM

## 2025-08-11 RX ORDER — PANTOPRAZOLE SODIUM 40 MG/1
40 TABLET, DELAYED RELEASE ORAL
Qty: 90 TABLET | Refills: 1 | Status: SHIPPED | OUTPATIENT
Start: 2025-08-11

## 2025-08-12 ENCOUNTER — OFFICE VISIT (OUTPATIENT)
Age: 60
End: 2025-08-12

## 2025-08-12 VITALS
HEIGHT: 65 IN | DIASTOLIC BLOOD PRESSURE: 76 MMHG | SYSTOLIC BLOOD PRESSURE: 120 MMHG | OXYGEN SATURATION: 98 % | WEIGHT: 195 LBS | HEART RATE: 78 BPM | BODY MASS INDEX: 32.49 KG/M2

## 2025-08-12 DIAGNOSIS — I83.812 VARICOSE VEINS OF LEFT LOWER EXTREMITY WITH PAIN: ICD-10-CM

## 2025-08-12 DIAGNOSIS — I83.811 VARICOSE VEINS OF RIGHT LOWER EXTREMITY WITH PAIN: Primary | ICD-10-CM

## 2025-08-22 ENCOUNTER — TELEPHONE (OUTPATIENT)
Facility: CLINIC | Age: 60
End: 2025-08-22

## 2025-08-22 ENCOUNTER — CLINICAL DOCUMENTATION (OUTPATIENT)
Facility: CLINIC | Age: 60
End: 2025-08-22

## 2025-08-22 ENCOUNTER — OFFICE VISIT (OUTPATIENT)
Facility: CLINIC | Age: 60
End: 2025-08-22
Payer: OTHER GOVERNMENT

## 2025-08-22 VITALS
HEART RATE: 75 BPM | WEIGHT: 196 LBS | OXYGEN SATURATION: 99 % | RESPIRATION RATE: 14 BRPM | HEIGHT: 65 IN | TEMPERATURE: 97.8 F | SYSTOLIC BLOOD PRESSURE: 153 MMHG | BODY MASS INDEX: 32.65 KG/M2 | DIASTOLIC BLOOD PRESSURE: 74 MMHG

## 2025-08-22 DIAGNOSIS — L40.50 PSORIATIC ARTHRITIS (HCC): ICD-10-CM

## 2025-08-22 DIAGNOSIS — R53.83 FATIGUE, UNSPECIFIED TYPE: ICD-10-CM

## 2025-08-22 DIAGNOSIS — M51.362 DEGENERATION OF INTERVERTEBRAL DISC OF LUMBAR REGION WITH DISCOGENIC BACK PAIN AND LOWER EXTREMITY PAIN: ICD-10-CM

## 2025-08-22 DIAGNOSIS — M50.30 DDD (DEGENERATIVE DISC DISEASE), CERVICAL: ICD-10-CM

## 2025-08-22 DIAGNOSIS — R40.0 DAYTIME SOMNOLENCE: ICD-10-CM

## 2025-08-22 DIAGNOSIS — I10 ESSENTIAL (PRIMARY) HYPERTENSION: Primary | ICD-10-CM

## 2025-08-22 DIAGNOSIS — R06.83 SNORING: ICD-10-CM

## 2025-08-22 PROCEDURE — 3078F DIAST BP <80 MM HG: CPT | Performed by: FAMILY MEDICINE

## 2025-08-22 PROCEDURE — 3077F SYST BP >= 140 MM HG: CPT | Performed by: FAMILY MEDICINE

## 2025-08-22 PROCEDURE — 99214 OFFICE O/P EST MOD 30 MIN: CPT | Performed by: FAMILY MEDICINE

## 2025-08-22 RX ORDER — LIDOCAINE 50 MG/G
PATCH TOPICAL
Qty: 90 PATCH | Refills: 1 | Status: SHIPPED | OUTPATIENT
Start: 2025-08-22

## 2025-08-22 SDOH — ECONOMIC STABILITY: FOOD INSECURITY: WITHIN THE PAST 12 MONTHS, THE FOOD YOU BOUGHT JUST DIDN'T LAST AND YOU DIDN'T HAVE MONEY TO GET MORE.: NEVER TRUE

## 2025-08-22 SDOH — ECONOMIC STABILITY: FOOD INSECURITY: WITHIN THE PAST 12 MONTHS, YOU WORRIED THAT YOUR FOOD WOULD RUN OUT BEFORE YOU GOT MONEY TO BUY MORE.: NEVER TRUE

## 2025-08-22 ASSESSMENT — PATIENT HEALTH QUESTIONNAIRE - PHQ9
1. LITTLE INTEREST OR PLEASURE IN DOING THINGS: NOT AT ALL
SUM OF ALL RESPONSES TO PHQ QUESTIONS 1-9: 0
2. FEELING DOWN, DEPRESSED OR HOPELESS: NOT AT ALL
SUM OF ALL RESPONSES TO PHQ QUESTIONS 1-9: 0

## 2025-08-29 ENCOUNTER — HOSPITAL ENCOUNTER (OUTPATIENT)
Age: 60
Discharge: HOME | End: 2025-08-29
Payer: OTHER GOVERNMENT

## 2025-08-29 DIAGNOSIS — R06.83 SNORING: ICD-10-CM

## 2025-08-29 DIAGNOSIS — R40.0 DAYTIME SOMNOLENCE: ICD-10-CM

## 2025-08-29 DIAGNOSIS — I10 ESSENTIAL (PRIMARY) HYPERTENSION: ICD-10-CM

## 2025-08-29 DIAGNOSIS — R53.83 FATIGUE, UNSPECIFIED TYPE: ICD-10-CM

## 2025-08-29 LAB
25(OH)D3 SERPL-MCNC: 32.8 NG/ML (ref 30–100)
CHOLEST SERPL-MCNC: 160 MG/DL
HDLC SERPL-MCNC: 58 MG/DL (ref 40–60)
HDLC SERPL: 2.8 (ref 0–5)
LDLC SERPL CALC-MCNC: 85 MG/DL (ref 0–100)
TRIGL SERPL-MCNC: 87 MG/DL (ref 0–150)
TSH, 3RD GENERATION: 1.14 UIU/ML (ref 0.27–4.2)
VLDLC SERPL CALC-MCNC: 17 MG/DL

## 2025-08-29 PROCEDURE — 82306 VITAMIN D 25 HYDROXY: CPT

## 2025-08-29 PROCEDURE — 84443 ASSAY THYROID STIM HORMONE: CPT

## 2025-08-29 PROCEDURE — 36415 COLL VENOUS BLD VENIPUNCTURE: CPT

## 2025-08-29 PROCEDURE — 80061 LIPID PANEL: CPT

## (undated) DEVICE — GLOVE SURG SZ 8 L11.77IN FNGR THK9.8MIL STRW LTX POLYMER

## (undated) DEVICE — Device

## (undated) DEVICE — STERILE POLYISOPRENE POWDER-FREE SURGICAL GLOVES: Brand: PROTEXIS

## (undated) DEVICE — SYR 10ML LUER LOK 1/5ML GRAD --

## (undated) DEVICE — BLANKET WRM AD W50XL85.8IN PACU FULL BODY FORC AIR

## (undated) DEVICE — INTENDED FOR TISSUE SEPARATION, AND OTHER PROCEDURES THAT REQUIRE A SHARP SURGICAL BLADE TO PUNCTURE OR CUT.: Brand: BARD-PARKER ®  SAFETY SCALPED

## (undated) DEVICE — FLEX ADVANTAGE 3000CC: Brand: FLEX ADVANTAGE

## (undated) DEVICE — GARMENT,MEDLINE,DVT,SEQUENTIAL,CALF,MD: Brand: MEDLINE

## (undated) DEVICE — DRAPE TOWEL: Brand: CONVERTORS

## (undated) DEVICE — COLUMN DRAPE

## (undated) DEVICE — LAPAROSCOPIC SMOKE FILTRATION SYSTEM: Brand: PALL LAPAROSHIELD® PLUS LAPAROSCOPIC SMOKE FILTRATION SYSTEM

## (undated) DEVICE — KIT CLN UP BON SECOURS MARYV

## (undated) DEVICE — REM POLYHESIVE ADULT PATIENT RETURN ELECTRODE: Brand: VALLEYLAB

## (undated) DEVICE — SUTURE VCRL SZ 0 L18IN ABSRB UD POLYGLACTIN 910 BRAID TIE J912G

## (undated) DEVICE — COVER LT HNDL BLU STRL -- MEDICHOICE

## (undated) DEVICE — SOLUTION IV 1000ML 0.9% SOD CHL

## (undated) DEVICE — DRAPE TWL SURG 16X26IN BLU ORB04] ALLCARE INC]

## (undated) DEVICE — ARM DRAPE

## (undated) DEVICE — NEEDLE HYPO 25GA L1.5IN BVL ORIENTED ECLIPSE

## (undated) DEVICE — MASTISOL ADHESIVE LIQ 2/3ML

## (undated) DEVICE — MAYO STAND COVER: Brand: CONVERTORS

## (undated) DEVICE — GOWN,SIRUS,POLYRNF,SETINSLV,XL,20/CS: Brand: MEDLINE

## (undated) DEVICE — SOFT SILICONE HYDROCELLULAR SACRUM DRESSING WITH LOCK AWAY LAYER: Brand: ALLEVYN LIFE SACRUM (LARGE) PACK OF 10

## (undated) DEVICE — LAPAROSCOPIC TROCAR SLEEVE/SINGLE USE: Brand: KII® OPTICAL ACCESS SYSTEM

## (undated) DEVICE — BANDAGE ADH W0.75XL3IN UNIV WVN FAB NAT GEN USE STRP N ADH

## (undated) DEVICE — MEDI-VAC NON-CONDUCTIVE SUCTION TUBING: Brand: CARDINAL HEALTH

## (undated) DEVICE — GLOVE SURG BIOGEL 8.0 STRL -- SKINSENSE

## (undated) DEVICE — ELECTRO LUBE IS A SINGLE PATIENT USE DEVICE THAT IS INTENDED TO BE USED ON ELECTROSURGICAL ELECTRODES TO REDUCE STICKING.: Brand: KEY SURGICAL ELECTRO LUBE

## (undated) DEVICE — TISSUE RETRIEVAL SYSTEM: Brand: INZII RETRIEVAL SYSTEM

## (undated) DEVICE — SUTURE MCRYL SZ 4-0 L27IN ABSRB UD L24MM PS-1 3/8 CIR PRIM Y935H

## (undated) DEVICE — TIP COVER ACCESSORY

## (undated) DEVICE — MEDI-VAC NON-CONDUCTIVE SUCTION TUBING 7MM X 6.1M (20 FT.) L: Brand: CARDINAL HEALTH

## (undated) DEVICE — SYSTEM EVAC SMOKE LAPARSCOPIC

## (undated) DEVICE — CLIP INT M L POLYMER LOK LIG HEM O LOK

## (undated) DEVICE — SEAL UNIV 5-8MM DISP BX/10 -- DA VINCI XI - SNGL USE

## (undated) DEVICE — PREP SKN CHLRAPRP APL 26ML STR --